# Patient Record
Sex: FEMALE | Race: WHITE | NOT HISPANIC OR LATINO | Employment: OTHER | URBAN - METROPOLITAN AREA
[De-identification: names, ages, dates, MRNs, and addresses within clinical notes are randomized per-mention and may not be internally consistent; named-entity substitution may affect disease eponyms.]

---

## 2020-06-18 ENCOUNTER — TELEPHONE (OUTPATIENT)
Dept: RHEUMATOLOGY | Facility: CLINIC | Age: 65
End: 2020-06-18

## 2020-06-18 NOTE — TELEPHONE ENCOUNTER
----- Message from Valerie Arthur sent at 6/18/2020  1:26 PM CDT -----  Regarding: Pt Advice  Contact: Pt  States that she would like to have a medical record release form mailed to her at the address on file for her to send to her previous provider to have medical records sent to Dr Cortés. Please call back at [phone]//thank you

## 2020-06-18 NOTE — TELEPHONE ENCOUNTER
Spoke with pt and she was calling to get a release form mailed to her home to get her records sent to us before her 7.20.20appt. 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 States she has a lot of demyelination and white brain matter changes and was told she had early MS. States that she was given nuvigil and norco but did not want to take the norco because she was afraid of becoming addicted. States she was put on tramadol and that takes the edge off. States her  because ill and had vascular dementia so they moved back to [place] and he passed 12.25. Pt states that she saw a PCP at [facility] and was taken off all her medications and has since been having a lot of problems. 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 States she was told she needed to see a rheumatology for fibromyalgia. Pt also states that her sister sees Dr. DUEÑAS but she does not want us to know who her sister is because  She does not want her sister to know any of her medical information. Advised patient we would not release her information without her consent. Advised patient we would mail her a authorization for release of medical records for her to sign and mail to her previous MD. Pt verbalized understanding

## 2020-07-17 ENCOUNTER — TELEPHONE (OUTPATIENT)
Dept: RHEUMATOLOGY | Facility: CLINIC | Age: 65
End: 2020-07-17

## 2020-07-17 NOTE — TELEPHONE ENCOUNTER
Spoke with pt and confirmed appointment with Dr. Cortés for 7.20.20 at 11.45 am. Advised patient of check in process due to COVID 19.       Pt declined virtual visit for this appointment.

## 2020-07-20 ENCOUNTER — TELEPHONE (OUTPATIENT)
Dept: RHEUMATOLOGY | Facility: CLINIC | Age: 65
End: 2020-07-20

## 2020-07-20 ENCOUNTER — LAB VISIT (OUTPATIENT)
Dept: LAB | Facility: HOSPITAL | Age: 65
End: 2020-07-20
Attending: INTERNAL MEDICINE
Payer: MEDICARE

## 2020-07-20 ENCOUNTER — OFFICE VISIT (OUTPATIENT)
Dept: RHEUMATOLOGY | Facility: CLINIC | Age: 65
End: 2020-07-20
Payer: MEDICARE

## 2020-07-20 VITALS
SYSTOLIC BLOOD PRESSURE: 127 MMHG | WEIGHT: 144.38 LBS | HEART RATE: 80 BPM | BODY MASS INDEX: 23.2 KG/M2 | HEIGHT: 66 IN | DIASTOLIC BLOOD PRESSURE: 77 MMHG

## 2020-07-20 DIAGNOSIS — F41.8 DEPRESSION WITH ANXIETY: ICD-10-CM

## 2020-07-20 DIAGNOSIS — M79.7 FIBROMYALGIA: ICD-10-CM

## 2020-07-20 DIAGNOSIS — M79.7 FIBROMYALGIA: Primary | ICD-10-CM

## 2020-07-20 DIAGNOSIS — R29.90 MULTIPLE NEUROLOGICAL SYMPTOMS: ICD-10-CM

## 2020-07-20 DIAGNOSIS — R25.1 TREMORS OF NERVOUS SYSTEM: ICD-10-CM

## 2020-07-20 DIAGNOSIS — R27.8 OTHER LACK OF COORDINATION: ICD-10-CM

## 2020-07-20 LAB
ALBUMIN SERPL BCP-MCNC: 4.2 G/DL (ref 3.5–5.2)
ALP SERPL-CCNC: 123 U/L (ref 55–135)
ALT SERPL W/O P-5'-P-CCNC: 28 U/L (ref 10–44)
ANION GAP SERPL CALC-SCNC: 8 MMOL/L (ref 8–16)
AST SERPL-CCNC: 32 U/L (ref 10–40)
BASOPHILS # BLD AUTO: 0.04 K/UL (ref 0–0.2)
BASOPHILS NFR BLD: 0.8 % (ref 0–1.9)
BILIRUB SERPL-MCNC: 0.2 MG/DL (ref 0.1–1)
BUN SERPL-MCNC: 11 MG/DL (ref 8–23)
CALCIUM SERPL-MCNC: 9.6 MG/DL (ref 8.7–10.5)
CHLORIDE SERPL-SCNC: 101 MMOL/L (ref 95–110)
CK SERPL-CCNC: 55 U/L (ref 20–180)
CO2 SERPL-SCNC: 30 MMOL/L (ref 23–29)
CREAT SERPL-MCNC: 0.8 MG/DL (ref 0.5–1.4)
CRP SERPL-MCNC: 4.8 MG/L (ref 0–8.2)
DIFFERENTIAL METHOD: ABNORMAL
EOSINOPHIL # BLD AUTO: 0.1 K/UL (ref 0–0.5)
EOSINOPHIL NFR BLD: 2.5 % (ref 0–8)
ERYTHROCYTE [DISTWIDTH] IN BLOOD BY AUTOMATED COUNT: 13.3 % (ref 11.5–14.5)
ERYTHROCYTE [SEDIMENTATION RATE] IN BLOOD BY WESTERGREN METHOD: 11 MM/HR (ref 0–36)
EST. GFR  (AFRICAN AMERICAN): >60 ML/MIN/1.73 M^2
EST. GFR  (NON AFRICAN AMERICAN): >60 ML/MIN/1.73 M^2
GLUCOSE SERPL-MCNC: 86 MG/DL (ref 70–110)
HCT VFR BLD AUTO: 43.9 % (ref 37–48.5)
HGB BLD-MCNC: 13.5 G/DL (ref 12–16)
IMM GRANULOCYTES # BLD AUTO: 0.02 K/UL (ref 0–0.04)
IMM GRANULOCYTES NFR BLD AUTO: 0.4 % (ref 0–0.5)
LYMPHOCYTES # BLD AUTO: 1.2 K/UL (ref 1–4.8)
LYMPHOCYTES NFR BLD: 22.8 % (ref 18–48)
MCH RBC QN AUTO: 28.6 PG (ref 27–31)
MCHC RBC AUTO-ENTMCNC: 30.8 G/DL (ref 32–36)
MCV RBC AUTO: 93 FL (ref 82–98)
MONOCYTES # BLD AUTO: 0.5 K/UL (ref 0.3–1)
MONOCYTES NFR BLD: 8.9 % (ref 4–15)
NEUTROPHILS # BLD AUTO: 3.4 K/UL (ref 1.8–7.7)
NEUTROPHILS NFR BLD: 64.6 % (ref 38–73)
NRBC BLD-RTO: 0 /100 WBC
PLATELET # BLD AUTO: 255 K/UL (ref 150–350)
PMV BLD AUTO: 10.9 FL (ref 9.2–12.9)
POTASSIUM SERPL-SCNC: 3.6 MMOL/L (ref 3.5–5.1)
PROT SERPL-MCNC: 7.9 G/DL (ref 6–8.4)
RBC # BLD AUTO: 4.72 M/UL (ref 4–5.4)
SODIUM SERPL-SCNC: 139 MMOL/L (ref 136–145)
TSH SERPL DL<=0.005 MIU/L-ACNC: 0.9 UIU/ML (ref 0.4–4)
WBC # BLD AUTO: 5.18 K/UL (ref 3.9–12.7)

## 2020-07-20 PROCEDURE — 85025 COMPLETE CBC W/AUTO DIFF WBC: CPT

## 2020-07-20 PROCEDURE — 86235 NUCLEAR ANTIGEN ANTIBODY: CPT | Mod: 59

## 2020-07-20 PROCEDURE — 99205 PR OFFICE/OUTPT VISIT, NEW, LEVL V, 60-74 MIN: ICD-10-PCS | Mod: S$GLB,,, | Performed by: INTERNAL MEDICINE

## 2020-07-20 PROCEDURE — 86431 RHEUMATOID FACTOR QUANT: CPT

## 2020-07-20 PROCEDURE — 86200 CCP ANTIBODY: CPT

## 2020-07-20 PROCEDURE — 82306 VITAMIN D 25 HYDROXY: CPT

## 2020-07-20 PROCEDURE — 86038 ANTINUCLEAR ANTIBODIES: CPT

## 2020-07-20 PROCEDURE — 99205 OFFICE O/P NEW HI 60 MIN: CPT | Mod: S$GLB,,, | Performed by: INTERNAL MEDICINE

## 2020-07-20 PROCEDURE — 86039 ANTINUCLEAR ANTIBODIES (ANA): CPT

## 2020-07-20 PROCEDURE — 3008F PR BODY MASS INDEX (BMI) DOCUMENTED: ICD-10-PCS | Mod: CPTII,S$GLB,, | Performed by: INTERNAL MEDICINE

## 2020-07-20 PROCEDURE — 85652 RBC SED RATE AUTOMATED: CPT

## 2020-07-20 PROCEDURE — 99999 PR PBB SHADOW E&M-EST. PATIENT-LVL V: ICD-10-PCS | Mod: PBBFAC,,, | Performed by: INTERNAL MEDICINE

## 2020-07-20 PROCEDURE — 82085 ASSAY OF ALDOLASE: CPT

## 2020-07-20 PROCEDURE — 3008F BODY MASS INDEX DOCD: CPT | Mod: CPTII,S$GLB,, | Performed by: INTERNAL MEDICINE

## 2020-07-20 PROCEDURE — 80053 COMPREHEN METABOLIC PANEL: CPT

## 2020-07-20 PROCEDURE — 82550 ASSAY OF CK (CPK): CPT

## 2020-07-20 PROCEDURE — 82607 VITAMIN B-12: CPT

## 2020-07-20 PROCEDURE — 99999 PR PBB SHADOW E&M-EST. PATIENT-LVL V: CPT | Mod: PBBFAC,,, | Performed by: INTERNAL MEDICINE

## 2020-07-20 PROCEDURE — 36415 COLL VENOUS BLD VENIPUNCTURE: CPT

## 2020-07-20 PROCEDURE — 86140 C-REACTIVE PROTEIN: CPT

## 2020-07-20 PROCEDURE — 84443 ASSAY THYROID STIM HORMONE: CPT

## 2020-07-20 RX ORDER — MODAFINIL 200 MG/1
200 TABLET ORAL 2 TIMES DAILY
COMMUNITY
End: 2020-10-01

## 2020-07-20 RX ORDER — ESCITALOPRAM OXALATE 20 MG/1
20 TABLET ORAL
COMMUNITY
End: 2020-08-12

## 2020-07-20 RX ORDER — ALPRAZOLAM 1 MG/1
1 TABLET, ORALLY DISINTEGRATING ORAL
COMMUNITY
End: 2020-09-09

## 2020-07-20 RX ORDER — TRAMADOL HYDROCHLORIDE 50 MG/1
100 TABLET ORAL
COMMUNITY
End: 2020-10-01

## 2020-07-20 RX ORDER — DULOXETIN HYDROCHLORIDE 60 MG/1
60 CAPSULE, DELAYED RELEASE ORAL
COMMUNITY
Start: 2020-06-12 | End: 2020-08-17

## 2020-07-20 NOTE — Clinical Note
Labs today, [facility], [facility]/internal medicine/neuropsychology.

## 2020-07-21 LAB
25(OH)D3+25(OH)D2 SERPL-MCNC: 81 NG/ML (ref 30–96)
ANA PATTERN 1: NORMAL
ANA SER QL IF: POSITIVE
ANA TITR SER IF: NORMAL {TITER}
CCP AB SER IA-ACNC: <0.5 U/ML
RHEUMATOID FACT SERPL-ACNC: <10 IU/ML (ref 0–15)
VIT B12 SERPL-MCNC: 547 PG/ML (ref 210–950)

## 2020-07-21 NOTE — PROGRESS NOTES
RHEUMATOLOGY CLINIC INITIAL VISIT    Reason for referral:-  Fibromyalgia    Chief complaints:-  Pain all over the body, severe fatigue, weakness    HPI:-  Nirali Nieto a 65 y.o. pleasant female comes in for an initial visit with above chief complaints.  She has longstanding history of multiple neurological problems.  She lived in [place] and moved here recently.  When she lived in [place] she underwent multiple workup and was under care of neurologist.  After multiple workup remained negative she was diagnosed with fibromyalgia with neurological features.  She has been on multiple medications with no significant improvement of her symptoms.  She also has severe depression and anxiety disorder.  She has been noticing worsening tremors in the past several months.  She denies any photosensitive malar rash, sicca syndrome or Raynaud's phenomenon.  No prolonged morning stiffness.  Stiffness lasts all day long all over the body.  No significant swelling of small joints of hands or feet.  No personal or family history of psoriasis.    Review of Systems   Constitutional: Positive for malaise/fatigue. Negative for chills and fever.   HENT: Negative for congestion and sore throat.    Eyes: Negative for blurred vision and redness.   Respiratory: Negative for cough and shortness of breath.    Cardiovascular: Negative for chest pain and leg swelling.   Gastrointestinal: Negative for abdominal pain.   Genitourinary: Negative for dysuria.   Musculoskeletal: Positive for back pain, joint pain, myalgias and neck pain. Negative for falls.   Skin: Negative for rash.   Neurological: Positive for dizziness, tingling, tremors, focal weakness, weakness and headaches.   Endo/Heme/Allergies: Positive for environmental allergies. Bruises/bleeds easily.   Psychiatric/Behavioral: Positive for depression and memory loss. The patient is nervous/anxious and has insomnia.        Past Medical History:   Diagnosis Date    Anxiety      "Chronic fatigue     Depression     Fibromyalgia        Past Surgical History:   Procedure Laterality Date    HYSTERECTOMY          Social History     Tobacco Use    Smoking status: Former Smoker     Types: Cigarettes     Quit date: 2013     Years since quittin.5    Smokeless tobacco: Never Used   Substance Use Topics    Alcohol use: Never     Frequency: Never    Drug use: Never       History reviewed. No pertinent family history.    Review of patient's allergies indicates:  No Known Allergies    Vitals:    20 1207   BP: 127/77   Pulse: 80   Weight: 65.5 kg (144 lb 6.4 oz)   Height: 5' 6" (1.676 m)   PainSc:   3   PainLoc: Generalized       Physical Exam   Constitutional: She is oriented to person, place, and time and well-developed, well-nourished, and in no distress. No distress.   HENT:   Head: Normocephalic.   Mouth/Throat: Oropharynx is clear and moist.   Eyes: Pupils are equal, round, and reactive to light. Conjunctivae and EOM are normal.   Neck: Normal range of motion. Neck supple.   Cardiovascular: Normal rate and intact distal pulses.   Pulmonary/Chest: Effort normal. No respiratory distress.   Abdominal: Soft. There is no abdominal tenderness.   Musculoskeletal:      Comments: No synovitis over small joints of hands or feet.  No effusion over large joints.   Neurological: She is alert and oriented to person, place, and time. No cranial nerve deficit. She exhibits abnormal muscle tone. Coordination abnormal.   Widespread tremors.   Skin: Skin is warm. No rash noted. No erythema.   Psychiatric: Mood and affect normal.   Nursing note and vitals reviewed.        Medication List with Changes/Refills   New Medications    NALTREXONE CAPSULE    Take 1 capsule (4.5 mg total) by mouth every evening.   Current Medications    ALPRAZOLAM (NIRAVAM) 1 MG DISSOLVABLE TABLET    Take 1 mg by mouth.    CHOLECALCIFEROL, VITAMIN D3, (VITAMIN D3 ORAL)    Take 5,000 Int'l Units by mouth.    DULOXETINE " (CYMBALTA) 60 MG CAPSULE    Take 60 mg by mouth.    EPINEPHRINE (EPIPEN INJ)    Inject as directed.    ESCITALOPRAM OXALATE (LEXAPRO) 20 MG TABLET    Take 20 mg by mouth.    MODAFINIL (PROVIGIL) 200 MG TAB    Take 200 mg by mouth.    NON FORMULARY MEDICATION    CBD Full spectrum high potency    ONDANSETRON 8 MG FILM    Take 8 mg by mouth.    SUMATRIPTAN SUCCINATE ORAL    Take by mouth.    TRAMADOL (ULTRAM) 50 MG TABLET    Take 100 mg by mouth as needed.       Assessment/Plans:-  1. Fibromyalgia    2. Depression with anxiety    3. Tremors of nervous system    4. Multiple neurological symptoms    5. Other lack of coordination       Plans:-  1.  Uncontrolled fibromyalgia with multiple tender points, widespread pain index and chronic fatigue.  Start low-dose naltrexone  2.  Severe depression with anxiety disorder for prolonged duration.  Referral sent to psychiatrist  3.  She has multiple neurological symptoms including lack of coordination and longstanding tremors.  Unclear etiology.  Not sure how this is related to fibromyalgia.  Referral sent a neurologist and neuropsychologist.        Problem List Items Addressed This Visit     Depression with anxiety    Relevant Orders    Ambulatory referral/consult to Psychiatry    Ambulatory referral/consult to Neuropsychology    CBC auto differential (Completed)    Comprehensive metabolic panel (Completed)    Cyclic Citrullinated Peptide Antibody, IgG (Completed)    VIPUL Screen w/Reflex    C-Reactive Protein (Completed)    Sedimentation rate (Completed)    Rheumatoid factor (Completed)    CK (Completed)    Aldolase    Vitamin D    TSH (Completed)    Ambulatory referral/consult to Internal Medicine    Tremors of nervous system    Relevant Orders    Ambulatory referral/consult to Psychiatry    Ambulatory referral/consult to Neuropsychology    CBC auto differential (Completed)    Comprehensive metabolic panel (Completed)    Cyclic Citrullinated Peptide Antibody, IgG (Completed)     VIPUL Screen w/Reflex    C-Reactive Protein (Completed)    Sedimentation rate (Completed)    Rheumatoid factor (Completed)    CK (Completed)    Aldolase    Vitamin D    TSH (Completed)    Vitamin B12    Ambulatory referral/consult to Internal Medicine    Ambulatory referral/consult to Neurology    Fibromyalgia - Primary    Relevant Medications    naltrexone capsule    Other Relevant Orders    CBC auto differential (Completed)    Comprehensive metabolic panel (Completed)    Cyclic Citrullinated Peptide Antibody, IgG (Completed)    VIPUL Screen w/Reflex    C-Reactive Protein (Completed)    Sedimentation rate (Completed)    Rheumatoid factor (Completed)    CK (Completed)    Aldolase    Vitamin D    TSH (Completed)    Ambulatory referral/consult to Internal Medicine      Other Visit Diagnoses     Multiple neurological symptoms        Relevant Orders    Vitamin B12    Ambulatory referral/consult to Neurology    Other lack of coordination         Relevant Orders    Vitamin B12    Ambulatory referral/consult to Neurology          Follow up in about 4 weeks (around 8/17/2020).    Thank you for allowing me to participate in the care Odalisyohannes Moody.    Disclaimer: This note was prepared using voice recognition system and is likely to have sound alike errors and is not proof read.  Please call me with any questions.

## 2020-07-22 LAB — ALDOLASE SERPL-CCNC: 5.2 U/L (ref 1.2–7.6)

## 2020-07-23 LAB
ANTI SM ANTIBODY: 0.11 RATIO (ref 0–0.99)
ANTI SM/RNP ANTIBODY: 0.11 RATIO (ref 0–0.99)
ANTI-SM INTERPRETATION: NEGATIVE
ANTI-SM/RNP INTERPRETATION: NEGATIVE
ANTI-SSA ANTIBODY: 0.08 RATIO (ref 0–0.99)
ANTI-SSA INTERPRETATION: NEGATIVE
ANTI-SSB ANTIBODY: 0.05 RATIO (ref 0–0.99)
ANTI-SSB INTERPRETATION: NEGATIVE
DSDNA AB SER-ACNC: NORMAL [IU]/ML

## 2020-08-12 ENCOUNTER — OFFICE VISIT (OUTPATIENT)
Dept: PSYCHIATRY | Facility: CLINIC | Age: 65
End: 2020-08-12
Payer: COMMERCIAL

## 2020-08-12 ENCOUNTER — PATIENT MESSAGE (OUTPATIENT)
Dept: RHEUMATOLOGY | Facility: CLINIC | Age: 65
End: 2020-08-12

## 2020-08-12 DIAGNOSIS — F33.2 MDD (MAJOR DEPRESSIVE DISORDER), RECURRENT SEVERE, WITHOUT PSYCHOSIS: Primary | ICD-10-CM

## 2020-08-12 DIAGNOSIS — M79.7 FIBROMYALGIA: ICD-10-CM

## 2020-08-12 DIAGNOSIS — Z63.9 FAMILY DYNAMICS PROBLEM: ICD-10-CM

## 2020-08-12 DIAGNOSIS — T74.11XS PHYSICAL ABUSE OF ADULT BY PARTNER, SEQUELA: ICD-10-CM

## 2020-08-12 DIAGNOSIS — R25.1 TREMORS OF NERVOUS SYSTEM: ICD-10-CM

## 2020-08-12 DIAGNOSIS — T74.22XS CHILD SEXUAL ABUSE, SEQUELA: ICD-10-CM

## 2020-08-12 DIAGNOSIS — F43.21 GRIEF: ICD-10-CM
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
## 2020-08-12 PROBLEM — T74.11XA PHYSICAL ABUSE OF ADULT BY PARTNER: Status: ACTIVE | Noted: 2020-08-12

## 2020-08-12 PROBLEM — F41.8 DEPRESSION WITH ANXIETY: Status: RESOLVED | Noted: 2020-07-20 | Resolved: 2020-08-12

## 2020-08-12 PROBLEM — T74.22XA CHILD ABUSE, SEXUAL: Status: ACTIVE | Noted: 2020-08-12

## 2020-08-12 PROCEDURE — 90792 PSYCH DIAG EVAL W/MED SRVCS: CPT | Mod: 95,,, | Performed by: PSYCHIATRY & NEUROLOGY

## 2020-08-12 PROCEDURE — 90792 PR PSYCHIATRIC DIAGNOSTIC EVALUATION W/MEDICAL SERVICES: ICD-10-PCS | Mod: 95,,, | Performed by: PSYCHIATRY & NEUROLOGY

## 2020-08-12 RX ORDER — ESCITALOPRAM OXALATE 20 MG/1
20 TABLET ORAL DAILY
Qty: 30 TABLET | Refills: 11 | Status: SHIPPED | OUTPATIENT
Start: 2020-08-12 | End: 2020-08-17

## 2020-08-12 RX ORDER — LAMOTRIGINE 100 MG/1
TABLET ORAL
Qty: 30 TABLET | Refills: 2 | Status: SHIPPED | OUTPATIENT
Start: 2020-08-12 | End: 2020-08-17

## 2020-08-12 RX ORDER — LAMOTRIGINE 25 MG/1
TABLET ORAL
Qty: 49 TABLET | Refills: 0 | Status: SHIPPED | OUTPATIENT
Start: 2020-08-12 | End: 2020-08-17

## 2020-08-12 RX ORDER — DULOXETIN HYDROCHLORIDE 60 MG/1
60 CAPSULE, DELAYED RELEASE ORAL DAILY
Qty: 30 CAPSULE | Refills: 11 | Status: SHIPPED | OUTPATIENT
Start: 2020-08-12 | End: 2020-08-17

## 2020-08-12 SDOH — SOCIAL DETERMINANTS OF HEALTH (SDOH): PROBLEM RELATED TO PRIMARY SUPPORT GROUP, UNSPECIFIED: Z63.9

## 2020-08-12 NOTE — PATIENT INSTRUCTIONS
PLAN:     As no records accompany, such complicaes; yet  Epic reflects that she is on cymbalta and lexapro. (I renewed such)    Re: Benzo: Xanax     I see via [facility] that she HAD been on Xanax with varying amounts (usually the 1mg tab with amoutns from #30 to #120; most recently in May 2019 as #60 ; says she has parced out her use and has few left    She is clear that the Xanax was being Rx'd for FIBROMYALIA BY NEURO and not per se as primary anxiety / per se psyc.    As such I deferred writing Xanax myself at this time. She indicates has some left and she ahs appt with RADHA Orozco MD 8-19-20    See [facility] for further detail     She does not appear as she is misusing and was interested in my writing for Lamictal that may assist her with anxiety depression.     Nonetheless I explained risks of wihdrawal; she was aware.  I cautioned IF such occur , call 911 ior go to nearest ER. Understanding Expressed    She indicated that Rheumatology Dr Cortés is following and has already seen and following.    I did write for LAMICTAL as mood stabilizer starting at 25 mg and advancign to 100 mg over 1 month.    Please call  [facility] at [phone] and  arrange your FOLLOW UP TELEHEALTH (video)  appointment  with MISAEL Randall MD for Sept 9 or 10th and [facility] / next available Telehealth.     I did inform her that Intensive Outpatient Program (for processing grief and working to enhance mood and anxiety stabilization .     Yet she indicated that she was NOT interested in IOP at this time.     I made clear to her that such was not INPATIENT and that such is outpatient. Yet she again declined; she is aware can re request. Denies SI / HI psychosis.    She does have a nice compassionate side of her ; yet being overly kind to extent that she may not take care of her own needs appears an area for her to explore / work on (ie, boundaries).    She does live in [place] and such may be  challenge to frequent commuting for counseling. I did tell her of Telehealth and we will explore setting up for her vi telehealth    I did inform her that I and some counselors will be moving over to [facility] within month or so and she indicated she can drive to that ok.    I explained that even IF do telhealth it woul;d be helpful IF she comfortable actually comig into clinic. She expressed agreement.    Records: I have asked Med assit to attempt recorsd from CM Varghese / [facility] prac / [phone]    References: (reviewed with pt as well):    Anxiety &  phobia workbook by NAS Jewell PhD  (web retailers: used: $ 7-10)    Relaxation stress reduction workbook: JESSEE Araujo PhD ( used: $7-10)    Feeling Good Website: Rajat Armenta MD / [email or web address] [place] website (free)     [place]: Path to Better Sleep : https://www.veterantraining.[place].gov/insomnia/ (free)     Pt expressed appreciation for the visit today and did not have further question at this time though pt  was still informed to:     Call  if problems.    Call / Report Side Effects to Psyc MD     Encouraged to follow up with primary care / Gen Med MD for continued monitoring of general health and wellness.    Understanding was expressed; and no further concerns nor questions were raised at this time.     remember healthy self care:   eat right  attempt adequate rest   HANDWASHING / encourage such jesus. During this corona virus time   walk or light exercise within reason and as your general med team approves  read or explore any of reference materials / homework mentioned  reach out (I.e.,  connect with)  others who nuture and bring out best in you  avoid risky behaviors  keep your appointments  IF you  cannot make your appt THEN please call or go online to reschedule.  avoid  alcohol and illicit substances.  Look for the positive.  All is often relative-seek balance  Call sooner if needed : [phone]   Call 911 or go  to Emergency Room  (ER)  if any acute concerns
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
## 2020-08-13 NOTE — TELEPHONE ENCOUNTER
Yesterday had a lot of abdominal pain and got up to go to the bathroom and went normally and there was just a little blood on the toilet paper. States she then went back to sit down and started having the bad abdominal pains again and has to use her cane to get to the bathroom and had blood dripping now her legs and when she sat down she had bright red clots pass. Advised patient we would let Dr. DUEÑAS know but she needs to go to the ED for an evaluation. Pt states that she is not able to go to the ED right now because her father passed away yesterday and her sister is there and she is alone. Would like to know if there is something that could be called in to slow it down and help with the pain. Also states Dr. Richardson sent in an RX for lamictal yesterday that she has not picked up yet because she read about it and was concerned with the side effects. Would like Dr. DUEÑAS to review that as well and let know what to do. Please advise.

## 2020-08-13 NOTE — TELEPHONE ENCOUNTER
I called and discussed with patient. She has severe abdominal cramps and severe bright red blood per rectum associated with fatigue since yesterday. She has been taking low dose naltrexone since last month without any issues.  I advised her to get emergency medical help or go to ED immediately.

## 2020-08-15 ENCOUNTER — PATIENT MESSAGE (OUTPATIENT)
Dept: PSYCHIATRY | Facility: CLINIC | Age: 65
End: 2020-08-15

## 2020-08-17 ENCOUNTER — PATIENT MESSAGE (OUTPATIENT)
Dept: RHEUMATOLOGY | Facility: CLINIC | Age: 65
End: 2020-08-17

## 2020-08-17 ENCOUNTER — TELEPHONE (OUTPATIENT)
Dept: NEUROLOGY | Facility: CLINIC | Age: 65
End: 2020-08-17

## 2020-08-17 DIAGNOSIS — F33.2 MDD (MAJOR DEPRESSIVE DISORDER), RECURRENT SEVERE, WITHOUT PSYCHOSIS: ICD-10-CM

## 2020-08-17 RX ORDER — ESCITALOPRAM OXALATE 20 MG/1
20 TABLET ORAL EVERY MORNING
Qty: 30 TABLET | Refills: 2 | Status: SHIPPED | OUTPATIENT
Start: 2020-08-17 | End: 2020-09-09 | Stop reason: SDUPTHER

## 2020-08-17 RX ORDER — LAMOTRIGINE 100 MG/1
TABLET ORAL
Qty: 30 TABLET | Refills: 2 | Status: SHIPPED | OUTPATIENT
Start: 2020-08-17 | End: 2020-09-09

## 2020-08-17 RX ORDER — LAMOTRIGINE 25 MG/1
TABLET ORAL
Qty: 49 TABLET | Refills: 0 | Status: SHIPPED | OUTPATIENT
Start: 2020-08-17 | End: 2020-09-09

## 2020-08-17 RX ORDER — DULOXETIN HYDROCHLORIDE 60 MG/1
60 CAPSULE, DELAYED RELEASE ORAL DAILY
Qty: 30 CAPSULE | Refills: 2 | Status: SHIPPED | OUTPATIENT
Start: 2020-08-17 | End: 2020-09-09 | Stop reason: SDUPTHER

## 2020-08-17 NOTE — TELEPHONE ENCOUNTER
----- Message from Carley Godfrey sent at 8/17/2020  4:22 PM CDT -----  Contact: self/[phone]  Type:  Sooner Apoointment Request    Caller is requesting a sooner appointment.  Caller declined first available appointment listed below.  Caller will not accept being placed on the waitlist and is requesting a message be sent to doctor.  Name of Caller:patient  When is the first available appointment?10-18-20  Symptoms:Symptoms:   Would the patient rather a call back or a response via [employer]? Call back  Best Call Back Number:[phone]  Additional Information: Patient is referred by Dr. Milana Pichardo/[facility]

## 2020-08-18 ENCOUNTER — TELEPHONE (OUTPATIENT)
Dept: PSYCHIATRY | Facility: CLINIC | Age: 65
End: 2020-08-18

## 2020-08-18 NOTE — PROGRESS NOTES
Pt messaged after visit that  she now desires meds to go to [facility]    As such:    I cancelled ALL psyc med to prior local pharmacy and reissued ALL psyc med   to [facility]     lexapro 20   lamictal 25  lamictal 100   cymbalta 60     See orders for specific detail     I also called number in her demographics and left voicemail as she requested    D Post MD    Time 15 min

## 2020-08-19 ENCOUNTER — OFFICE VISIT (OUTPATIENT)
Dept: INTERNAL MEDICINE | Facility: CLINIC | Age: 65
End: 2020-08-19
Payer: MEDICARE

## 2020-08-19 VITALS
SYSTOLIC BLOOD PRESSURE: 134 MMHG | OXYGEN SATURATION: 97 % | DIASTOLIC BLOOD PRESSURE: 80 MMHG | BODY MASS INDEX: 22.71 KG/M2 | TEMPERATURE: 99 F | HEIGHT: 66 IN | HEART RATE: 96 BPM | WEIGHT: 141.31 LBS

## 2020-08-19 DIAGNOSIS — H53.9 VISION CHANGES: ICD-10-CM

## 2020-08-19 DIAGNOSIS — M79.7 FIBROMYALGIA: ICD-10-CM

## 2020-08-19 DIAGNOSIS — S09.90XA INJURY OF HEAD, INITIAL ENCOUNTER: ICD-10-CM

## 2020-08-19 DIAGNOSIS — E78.5 HYPERLIPIDEMIA, UNSPECIFIED HYPERLIPIDEMIA TYPE: ICD-10-CM

## 2020-08-19 DIAGNOSIS — F33.2 MDD (MAJOR DEPRESSIVE DISORDER), RECURRENT SEVERE, WITHOUT PSYCHOSIS: ICD-10-CM

## 2020-08-19 DIAGNOSIS — K62.5 RECTAL BLEEDING: Primary | ICD-10-CM

## 2020-08-19 DIAGNOSIS — N94.9 VAGINAL SYMPTOM: ICD-10-CM

## 2020-08-19 PROBLEM — G93.32 CHRONIC FATIGUE SYNDROME: Status: ACTIVE | Noted: 2019-10-14

## 2020-08-19 PROCEDURE — 3008F PR BODY MASS INDEX (BMI) DOCUMENTED: ICD-10-PCS | Mod: CPTII,S$GLB,, | Performed by: FAMILY MEDICINE

## 2020-08-19 PROCEDURE — 3008F BODY MASS INDEX DOCD: CPT | Mod: CPTII,S$GLB,, | Performed by: FAMILY MEDICINE

## 2020-08-19 PROCEDURE — 99204 OFFICE O/P NEW MOD 45 MIN: CPT | Mod: S$GLB,,, | Performed by: FAMILY MEDICINE

## 2020-08-19 PROCEDURE — 99999 PR PBB SHADOW E&M-EST. PATIENT-LVL V: CPT | Mod: PBBFAC,,, | Performed by: FAMILY MEDICINE

## 2020-08-19 PROCEDURE — 99204 PR OFFICE/OUTPT VISIT, NEW, LEVL IV, 45-59 MIN: ICD-10-PCS | Mod: S$GLB,,, | Performed by: FAMILY MEDICINE

## 2020-08-19 PROCEDURE — 99999 PR PBB SHADOW E&M-EST. PATIENT-LVL V: ICD-10-PCS | Mod: PBBFAC,,, | Performed by: FAMILY MEDICINE

## 2020-08-19 NOTE — LETTER
August 19, 2020      Kevin Cortés MD  [address]  [place] [address]           [facility]  [address] [place] [address]  Phone: [phone]  Fax: [phone]          Patient: Nirali Moody   MR Number: [id number]   YOB: 1955   Date of Visit: 8/19/2020       Dear Dr. Kevin Cortés:    Thank you for referring Nirali Moody to me for evaluation. Attached you will find relevant portions of my assessment and plan of care.    If you have questions, please do not hesitate to call me. I look forward to following Nirali Moody along with you.    Sincerely,    Ivy Orozco MD    Enclosure  CC:  No Recipients    If you would like to receive this communication electronically, please contact [email or web address] or [phone] to request more information on [employer] access.    For providers and/or their staff who would like to refer a patient to [facility], please contact us through our one-stop-shop provider referral line, [facility], at [phone].    If you feel you have received this communication in error or would no longer like to receive these types of communications, please e-mail [email or web address]

## 2020-08-19 NOTE — PROGRESS NOTES
"Subjective:       Patient ID: Nirali Moody is a 65 y.o. female.    Chief Complaint: Establish Care    Patient presents to clinic today to establish care. Reports her  passed away in 2019. Reports her father  in August. She has seen Dr. Cortés, Rheumatology, for treatment of fibromyalgia. She has seen Dr. Richardson, Psychiatry, for depression/anxiety. She requests that I refill her xanax, provigil and tramadol. She reports taking provigil for "brain fog". She indicates that she has had evaluation in the past by Neurology and they prescribed the provigil. She denies diagnosis of narcolepsy. She reports most of the items she marked on ROS are chronic and stable, except for a few. She reports abdominal cramping with rectal bleeding last weekend. Reports bloody mucous came out also. Reports stools were formed. This lasted for about 24 hours. Denies history of hemorrhoids. She reports this is due to stress from her father's death. She also reports her vagina had seemed closed for several months or longer, but after episode last weekend she reports it seems better. Patient also reports fall 2 months ago and shows picture on her phone showing significant brusing of left cheek. She reports she did not seek medical attention at that time. She reports vision changes since. She has not seen an eye doctor either. She reports history of elevated cholesterol, but does not desire to take a statin. Patient is otherwise without concerns today.      Review of Systems   Constitutional: Positive for activity change. Negative for unexpected weight change.   HENT: Positive for trouble swallowing. Negative for hearing loss and rhinorrhea.    Eyes: Positive for visual disturbance. Negative for discharge.   Respiratory: Positive for chest tightness. Negative for wheezing.    Cardiovascular: Positive for chest pain and palpitations.   Gastrointestinal: Positive for blood in stool. Negative for constipation, diarrhea and " vomiting.   Endocrine: Negative for polydipsia and polyuria.   Genitourinary: Negative for difficulty urinating, dysuria, hematuria and menstrual problem.   Musculoskeletal: Positive for arthralgias, joint swelling and neck pain.   Neurological: Positive for weakness and headaches.   Psychiatric/Behavioral: Positive for confusion and dysphoric mood.         Objective:      Physical Exam  Vitals signs reviewed.   Constitutional:       General: She is not in acute distress.     Appearance: Normal appearance. She is well-developed.   HENT:      Head: Normocephalic and atraumatic.      Right Ear: Tympanic membrane, ear canal and external ear normal.      Left Ear: Tympanic membrane, ear canal and external ear normal.      Nose: Nose normal. No mucosal edema or rhinorrhea.      Mouth/Throat:      Pharynx: Uvula midline.   Eyes:      General: Lids are normal. No scleral icterus.     Extraocular Movements: Extraocular movements intact.      Conjunctiva/sclera: Conjunctivae normal.      Pupils: Pupils are equal, round, and reactive to light.   Neck:      Musculoskeletal: Normal range of motion and neck supple.      Thyroid: No thyromegaly.   Cardiovascular:      Rate and Rhythm: Normal rate and regular rhythm.      Heart sounds: No murmur. No friction rub. No gallop.    Pulmonary:      Effort: Pulmonary effort is normal.      Breath sounds: Normal breath sounds. No wheezing, rhonchi or rales.   Abdominal:      General: Bowel sounds are normal. There is no distension.      Palpations: Abdomen is soft. There is no mass.      Tenderness: There is no abdominal tenderness.   Musculoskeletal: Normal range of motion.   Lymphadenopathy:      Cervical: No cervical adenopathy.   Skin:     General: Skin is warm and dry.      Findings: No lesion or rash.      Nails: There is no clubbing.     Neurological:      Mental Status: She is alert and oriented to person, place, and time.      Cranial Nerves: No cranial nerve deficit.       Sensory: No sensory deficit.      Gait: Gait normal.   Psychiatric:         Mood and Affect: Affect normal. Mood is anxious.         Assessment:       1. Rectal bleeding    2. Fibromyalgia    3. Vaginal symptom    4. Vision changes    5. Injury of head, initial encounter    6. Hyperlipidemia, unspecified hyperlipidemia type    7. MDD (major depressive disorder), recurrent severe, without psychosis        Plan:     Problem List Items Addressed This Visit     Fibromyalgia    Overview     Followed by Rheumatology         Head injury    Relevant Orders    CT Head W Wo Contrast    Hyperlipidemia    Relevant Orders    Lipid Panel    MDD (major depressive disorder), recurrent severe, without psychosis    Overview     Followed by Dr. Richardson, Psychiatry         Rectal bleeding - Primary    Relevant Orders    Ambulatory referral/consult to Gastroenterology    Vaginal symptom    Relevant Orders    Ambulatory referral/consult to Obstetrics / Gynecology    Vision changes    Relevant Orders    Ambulatory referral/consult to Optometry    CT Head W Wo Contrast        Patient asked several times during the visit for me to refill her xanax, provigil and tramadol. She was advised several times during the visit that I will not refill these medications. She was also informed prior to visit of this, but stated she was coming in to establish care. Advised that Dr. Cortés is treating her fibromyalgia and gave her naltrexone, she should not take tramadol with this medication. I also offered referral to pain management, she declines. I advised that Dr. Richardson is treating her psychiatric issues, so xanax is up to his discretion. Advised she can discuss the provigil with Neurology, prior referral made and appointment scheduled. Will have staff make sure she is on wait list for earlier appointment to see Dr. Ortiz.   Health Maintenance reviewed/updated. Patient reports most of HM has been done, release signed for previous records. Advised to  obtain flu vaccine this Fall.

## 2020-08-20 ENCOUNTER — TELEPHONE (OUTPATIENT)
Dept: SURGERY | Facility: CLINIC | Age: 65
End: 2020-08-20

## 2020-08-20 NOTE — TELEPHONE ENCOUNTER
Returned pt's call - Left voicemail message with my name and call back number [phone] - Will continue to try and reach pt throughout the day    ----- Message from Livier Nagy sent at 8/20/2020  1:32 PM CDT -----  ..Type:  Patient Returning Call    Who Called: pt   Who Left Message for Patient:  Does the patient know what this is regarding?:  appt   Would the patient rather a call back or a response via [facility]? Call back   Best Call Back Number:[phone]  Additional Information: pt is requesting a call from nurse to discuss an apt and other information

## 2020-08-24 ENCOUNTER — TELEPHONE (OUTPATIENT)
Dept: RHEUMATOLOGY | Facility: CLINIC | Age: 65
End: 2020-08-24

## 2020-08-27 ENCOUNTER — PATIENT OUTREACH (OUTPATIENT)
Dept: ADMINISTRATIVE | Facility: HOSPITAL | Age: 65
End: 2020-08-27

## 2020-08-27 ENCOUNTER — OFFICE VISIT (OUTPATIENT)
Dept: RHEUMATOLOGY | Facility: CLINIC | Age: 65
End: 2020-08-27
Payer: MEDICARE

## 2020-08-27 ENCOUNTER — TELEPHONE (OUTPATIENT)
Dept: RHEUMATOLOGY | Facility: CLINIC | Age: 65
End: 2020-08-27

## 2020-08-27 DIAGNOSIS — R29.90 MULTIPLE NEUROLOGICAL SYMPTOMS: ICD-10-CM

## 2020-08-27 DIAGNOSIS — M79.7 FIBROMYALGIA: Primary | ICD-10-CM

## 2020-08-27 DIAGNOSIS — R76.8 POSITIVE ANA (ANTINUCLEAR ANTIBODY): ICD-10-CM

## 2020-08-27 DIAGNOSIS — M62.838 MUSCLE SPASM: ICD-10-CM

## 2020-08-27 PROCEDURE — 99214 OFFICE O/P EST MOD 30 MIN: CPT | Mod: 95,,, | Performed by: INTERNAL MEDICINE

## 2020-08-27 PROCEDURE — 99214 PR OFFICE/OUTPT VISIT, EST, LEVL IV, 30-39 MIN: ICD-10-PCS | Mod: 95,,, | Performed by: INTERNAL MEDICINE

## 2020-08-27 NOTE — PROGRESS NOTES
RHEUMATOLOGY FOLLOW UP - TELE VISIT     The patient location is: [place]  The chief complaint leading to consultation is: Worsening cramps and spasms.  Visit type: Virtual visit with synchronous audio and video  Total time spent with patient: 15 minutes  Each patient to whom he or she provides medical services by telemedicine is:  (1) informed of the relationship between the physician and patient and the respective role of any other health care provider with respect to management of the patient; and (2) notified that he or she may decline to receive medical services by telemedicine and may withdraw from such care at any time.    HPI:-  Nirali Nieto a 65 y.o. pleasant female seen today through My chart video visit for follow up.  She reports significant improvement of fibromyalgia since starting naltrexone.  But her muscle spasms and cramps have been worsening.  She is having more tremors, numbness tingling all over the body.  Earliest that the neurologist could see her is in December.  Psychiatrist denied continuing her benzodiazepine at this time.    Review of Systems   Constitutional: Positive for malaise/fatigue. Negative for chills and fever.   HENT: Negative for congestion and sore throat.    Eyes: Negative for blurred vision and redness.   Respiratory: Negative for cough and shortness of breath.    Cardiovascular: Negative for chest pain and leg swelling.   Gastrointestinal: Negative for abdominal pain.   Genitourinary: Negative for dysuria.   Musculoskeletal: Positive for back pain, joint pain, myalgias and neck pain. Negative for falls.   Skin: Negative for rash.   Neurological: Positive for dizziness, tingling, tremors, focal weakness, weakness and headaches.   Endo/Heme/Allergies: Positive for environmental allergies. Bruises/bleeds easily.   Psychiatric/Behavioral: Positive for depression and memory loss. The patient is nervous/anxious and has insomnia.        Past Medical History:   Diagnosis Date     Anxiety     Chronic fatigue     Depression     Fibromyalgia        Past Surgical History:   Procedure Laterality Date    HYSTERECTOMY      TONSILLECTOMY          Social History     Tobacco Use    Smoking status: Former Smoker     Types: Cigarettes     Quit date: 2013     Years since quittin.6    Smokeless tobacco: Never Used   Substance Use Topics    Alcohol use: Never     Frequency: Never     Binge frequency: Never    Drug use: Never       Family History   Problem Relation Age of Onset    Arthritis Mother     Cancer Mother     Depression Mother     Stroke Mother     Diabetes Father     Arthritis Sister     Cancer Sister     Depression Sister     Kidney disease Sister     Depression Brother        Review of patient's allergies indicates:  No Known Allergies    Physical exam:-    GEN: awake, alert, non-diaphoretic, no psychomotor agitation, no acute distress    HEENT :Head: atraumatic, normocephalic, no rashes noted, no lesions noted;    Eyes: NO redness, discharge, swelling, or lesions    Nose: NO redness, swelling, discharge, deformity, or impetigo/crusting    Skin: no lesions, wounds, erythema, or cyanosis noted on face or hands    Cardiopulmonary: no increased respiratory effort, speaking in clear sentences    MSK: normal ROM in the neck, Upper extremities, Lower extremities  Good ROM of hands, fist formation 100% and , no obvious synovitis    Good ambulation in front of the camera    Neuro: cranial nerves grossly normal, speech normal rate and rhythm, orientation arrived to appointment on time with no prompting, moved both upper extremities equally    Pysch:  appearance, behavior, and attitude- well groomed, pleasant, cooperative    Medication List with Changes/Refills   Current Medications    ALPRAZOLAM (NIRAVAM) 1 MG DISSOLVABLE TABLET    Take 1 mg by mouth.    CHOLECALCIFEROL, VITAMIN D3, (VITAMIN D3 ORAL)    Take 5,000 Int'l Units by mouth.    DULOXETINE (CYMBALTA) 60 MG CAPSULE     Take 1 capsule (60 mg total) by mouth once daily.    EPINEPHRINE (EPIPEN INJ)    Inject as directed.    ESCITALOPRAM OXALATE (LEXAPRO) 20 MG TABLET    Take 1 tablet (20 mg total) by mouth every morning.    LAMOTRIGINE (LAMICTAL) 100 MG TABLET    1 tab by mouth at bed. Note: DO NOT START UNTIL you have Finished the Lamictal 25 mg supply. IF any RASH, STOP ALL Lamictal and Tell Tonia WELLS.    LAMOTRIGINE (LAMICTAL) 25 MG TABLET    By mouth and at night: 1 tab x 14 days THEN 2 tabs x 7 days THEN 3 tabs x 7 days THEN move to Lamictal 100 mg supply. Note: STOP if any RASH.    MODAFINIL (PROVIGIL) 200 MG TAB    Take 200 mg by mouth 2 (two) times daily.     NALTREXONE CAPSULE    Take 1 capsule (4.5 mg total) by mouth every evening.    NON FORMULARY MEDICATION    CBD Full spectrum high potency    ONDANSETRON 8 MG FILM    Take 8 mg by mouth as needed.     SUMATRIPTAN SUCCINATE ORAL    Take by mouth.    TRAMADOL (ULTRAM) 50 MG TABLET    Take 100 mg by mouth as needed.       Assessment/Plans:-  1. Fibromyalgia    2. Multiple neurological symptoms    3. Positive VIPUL (antinuclear antibody)    4. Muscle spasm      · Significant improvement of fibromyalgia on low-dose naltrexone.  Continue the same.  · Worsening muscle spasms all over the body with multiple other neurological symptoms.  Next appointment with Neurology is in December.  Will try to find another external neurologist who can see her sooner and restart her benzodiazepine.  · Positive VIPUL with negative LORNE without any evidence of underlying autoimmune inflammatory connective tissue disease.  Monitor for any new symptoms.    Problem List Items Addressed This Visit     Fibromyalgia - Primary    Overview     Followed by Rheumatology         Multiple neurological symptoms    Relevant Orders    Ambulatory referral/consult to Neurology    Positive VIPUL (antinuclear antibody)    Muscle spasm    Relevant Orders    Ambulatory referral/consult to Neurology          No follow-ups on  file.    Disclaimer: This note was prepared using voice recognition system and is likely to have sound alike errors and is not proof read.  Please call me with any questions.

## 2020-08-27 NOTE — TELEPHONE ENCOUNTER
Spoke with pt and scheduled virtual visit for 12.1.20 at 11.45 am. Will send referral to neuromedical center.

## 2020-08-27 NOTE — TELEPHONE ENCOUNTER
----- Message from Kevin Cortés MD sent at 8/27/2020  1:54 PM CDT -----  Please find her an external neurologist since the internal does not have openings at this time.

## 2020-08-31 ENCOUNTER — PATIENT OUTREACH (OUTPATIENT)
Dept: ADMINISTRATIVE | Facility: HOSPITAL | Age: 65
End: 2020-08-31

## 2020-08-31 ENCOUNTER — TELEPHONE (OUTPATIENT)
Dept: PSYCHIATRY | Facility: CLINIC | Age: 65
End: 2020-08-31

## 2020-09-01 NOTE — TELEPHONE ENCOUNTER
Called Ms Moody:    I had sent message 8-25- asking her to call and make follow up appt.     Yet I do not see that she had done so.    As such I called myself and left message that I would work her in this ed or thur 4;30p OR she can call [phone] and select / schedule time that works for her     IF any acute concerns call 911 or go to nearest ER     D Post MD

## 2020-09-03 ENCOUNTER — PATIENT MESSAGE (OUTPATIENT)
Dept: RHEUMATOLOGY | Facility: CLINIC | Age: 65
End: 2020-09-03

## 2020-09-08 ENCOUNTER — TELEPHONE (OUTPATIENT)
Dept: RHEUMATOLOGY | Facility: CLINIC | Age: 65
End: 2020-09-08

## 2020-09-08 NOTE — TELEPHONE ENCOUNTER
[facility]     Labs from previous provider in [facility] scanned in

## 2020-09-09 ENCOUNTER — HOSPITAL ENCOUNTER (OUTPATIENT)
Dept: RADIOLOGY | Facility: HOSPITAL | Age: 65
Discharge: HOME | End: 2020-09-09
Attending: FAMILY MEDICINE
Payer: MEDICARE

## 2020-09-09 ENCOUNTER — OFFICE VISIT (OUTPATIENT)
Dept: PSYCHIATRY | Facility: CLINIC | Age: 65
End: 2020-09-09
Payer: MEDICARE

## 2020-09-09 VITALS — SYSTOLIC BLOOD PRESSURE: 183 MMHG | HEART RATE: 73 BPM | DIASTOLIC BLOOD PRESSURE: 105 MMHG

## 2020-09-09 DIAGNOSIS — T74.22XS CHILD SEXUAL ABUSE, SEQUELA: ICD-10-CM

## 2020-09-09 DIAGNOSIS — M79.7 FIBROMYALGIA: ICD-10-CM
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
## 2020-09-09 DIAGNOSIS — Z63.9 FAMILY DYNAMICS PROBLEM: ICD-10-CM

## 2020-09-09 DIAGNOSIS — F33.2 MDD (MAJOR DEPRESSIVE DISORDER), RECURRENT SEVERE, WITHOUT PSYCHOSIS: ICD-10-CM

## 2020-09-09 DIAGNOSIS — F43.21 GRIEF: Primary | ICD-10-CM

## 2020-09-09 DIAGNOSIS — H53.9 VISION CHANGES: ICD-10-CM

## 2020-09-09 DIAGNOSIS — R25.1 TREMORS OF NERVOUS SYSTEM: ICD-10-CM

## 2020-09-09 DIAGNOSIS — S09.90XA INJURY OF HEAD, INITIAL ENCOUNTER: ICD-10-CM

## 2020-09-09 DIAGNOSIS — T74.11XS PHYSICAL ABUSE OF ADULT BY PARTNER, SEQUELA: ICD-10-CM

## 2020-09-09 PROCEDURE — 99999 PR PBB SHADOW E&M-EST. PATIENT-LVL I: ICD-10-PCS | Mod: PBBFAC,,, | Performed by: PSYCHIATRY & NEUROLOGY

## 2020-09-09 PROCEDURE — 99999 PR PBB SHADOW E&M-EST. PATIENT-LVL I: CPT | Mod: PBBFAC,,, | Performed by: PSYCHIATRY & NEUROLOGY

## 2020-09-09 PROCEDURE — 25500020 PHARM REV CODE 255: Performed by: FAMILY MEDICINE

## 2020-09-09 PROCEDURE — 99214 PR OFFICE/OUTPT VISIT, EST, LEVL IV, 30-39 MIN: ICD-10-PCS | Mod: 95,,, | Performed by: PSYCHIATRY & NEUROLOGY

## 2020-09-09 PROCEDURE — 70470 CT HEAD/BRAIN W/O & W/DYE: CPT | Mod: TC

## 2020-09-09 PROCEDURE — 99214 OFFICE O/P EST MOD 30 MIN: CPT | Mod: 95,,, | Performed by: PSYCHIATRY & NEUROLOGY

## 2020-09-09 RX ORDER — ESCITALOPRAM OXALATE 20 MG/1
20 TABLET ORAL EVERY MORNING
Qty: 30 TABLET | Refills: 2 | Status: SHIPPED | OUTPATIENT
Start: 2020-09-09 | End: 2020-10-22 | Stop reason: SDUPTHER

## 2020-09-09 RX ORDER — DULOXETIN HYDROCHLORIDE 60 MG/1
60 CAPSULE, DELAYED RELEASE ORAL EVERY MORNING
Qty: 30 CAPSULE | Refills: 2 | Status: SHIPPED | OUTPATIENT
Start: 2020-09-09 | End: 2020-10-22 | Stop reason: SDUPTHER

## 2020-09-09 RX ORDER — DULOXETIN HYDROCHLORIDE 30 MG/1
30 CAPSULE, DELAYED RELEASE ORAL EVERY MORNING
Qty: 30 CAPSULE | Refills: 2 | Status: SHIPPED | OUTPATIENT
Start: 2020-09-09 | End: 2020-10-22 | Stop reason: SDUPTHER

## 2020-09-09 RX ADMIN — IOHEXOL 100 ML: 350 INJECTION, SOLUTION INTRAVENOUS at 12:09

## 2020-09-09 SDOH — SOCIAL DETERMINANTS OF HEALTH (SDOH): PROBLEM RELATED TO PRIMARY SUPPORT GROUP, UNSPECIFIED: Z63.9
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
## 2020-09-10 ENCOUNTER — TELEPHONE (OUTPATIENT)
Dept: RHEUMATOLOGY | Facility: CLINIC | Age: 65
End: 2020-09-10

## 2020-09-10 NOTE — PATIENT INSTRUCTIONS
PLAN:     Follow up with [facility] D Post MD 4 weeks    Jake Hurd LCSW next available     Lamictal was stopped as headaches ; added as allergy ; as intolerant    Cymbalta advanced from 60 mg to 90 mg; offered hydroxyzine  / declined    Understanding Expressed    ENCOURAGED HER TO FOLLOW UP WITH DR PRATT WITHIN WEEK OR TWO FOR BP    She indicated that Rheumatology Dr Cortés is following and has already seen and following.   Jake Hurd LCSW / next available Telehealth.     I did inform her that Intensive Outpatient Program (for processing grief and working to enhance mood and anxiety stabilization .     She does have a nice compassionate side of her ; yet being overly kind to extent that she may not take care of her own needs appears an area for her to explore / work on (ie, boundaries).    [place] does live in [place] and such may be challenge to frequent commuting for counseling. I did tell her of Telehealth and we will explore setting up for her vi telehealth    Records: I have asked Med assit to attempt recorsd from CM Varghese / [facility]ac / [phone]    References: (reviewed with pt as well):    Anxiety &  phobia workbook by NAS Jewell PhD  (web retailers: used: $ 7-10)    Relaxation stress reduction workbook: JESSEE Araujo PhD ( used: $7-10)    Feeling Good Website: Rajat Armenta MD / www.feelinggood.Tailster website (free)     [place]: Path to Better Sleep : https://www.veterantraining.[place].gov/insomnia/ (free)     Pt expressed appreciation for the visit today and did not have further question at this time though pt  was still informed to:     Call  if problems.    Call / Report Side Effects to Psyc MD     Encouraged to follow up with primary care / Gen Med MD for continued monitoring of general health and wellness.    Understanding was expressed; and no further concerns nor questions were raised at this time.     remember healthy self care:   eat  right  attempt adequate rest   HANDWASHING / encourage such jesus. During this corona virus time   walk or light exercise within reason and as your general med team approves  read or explore any of reference materials / homework mentioned  reach out (I.e.,  connect with)  others who nuture and bring out best in you  avoid risky behaviors  keep your appointments  IF you  cannot make your appt THEN please call or go online to reschedule.  avoid  alcohol and illicit substances.  Look for the positive.  All is often relative-seek balance  Call sooner if needed : [phone]   Call 911 or go to Emergency Room  (ER)  if any acute concerns

## 2020-09-11 ENCOUNTER — TELEPHONE (OUTPATIENT)
Dept: FAMILY MEDICINE | Facility: CLINIC | Age: 65
End: 2020-09-11

## 2020-09-11 NOTE — TELEPHONE ENCOUNTER
----- Message from Ivy Orozco MD sent at 9/10/2020  9:17 PM CDT -----  Regarding: FW: Please Offer Clinic Visit for Assess HTN / BP  Please contact patient to schedule BP follow up. Thank you  ----- Message -----  From: Rajat Richardson MD  Sent: 9/9/2020   8:16 PM CDT  To: Ivy Orozco MD  Subject: Please Offer Clinic Visit for Assess HTN / BP    Nerissalo. Dr Orozco:     Nice to see you today    Than you for taking the time to meet with me to discuss coordination of care for Ms Moody.    Whether she accepts or not, would be helpful if your team would reach out to her and offer an med check slot to address her HTN : 183/105    I did tetl her IF any acute concerns to call 911 or got o ER.    Thank you.    Rajat Richardson DFAPA [facility]

## 2020-09-14 ENCOUNTER — PATIENT MESSAGE (OUTPATIENT)
Dept: RHEUMATOLOGY | Facility: CLINIC | Age: 65
End: 2020-09-14

## 2020-09-15 ENCOUNTER — OFFICE VISIT (OUTPATIENT)
Dept: INTERNAL MEDICINE | Facility: CLINIC | Age: 65
End: 2020-09-15
Payer: MEDICARE

## 2020-09-15 VITALS
HEART RATE: 72 BPM | BODY MASS INDEX: 23.26 KG/M2 | TEMPERATURE: 98 F | DIASTOLIC BLOOD PRESSURE: 88 MMHG | HEIGHT: 66 IN | WEIGHT: 144.75 LBS | SYSTOLIC BLOOD PRESSURE: 128 MMHG | OXYGEN SATURATION: 95 %

## 2020-09-15 DIAGNOSIS — R03.0 ELEVATED BLOOD PRESSURE, SITUATIONAL: ICD-10-CM

## 2020-09-15 PROCEDURE — 3008F PR BODY MASS INDEX (BMI) DOCUMENTED: ICD-10-PCS | Mod: CPTII,S$GLB,, | Performed by: FAMILY MEDICINE

## 2020-09-15 PROCEDURE — 99999 PR PBB SHADOW E&M-EST. PATIENT-LVL V: ICD-10-PCS | Mod: PBBFAC,,, | Performed by: FAMILY MEDICINE

## 2020-09-15 PROCEDURE — 99213 OFFICE O/P EST LOW 20 MIN: CPT | Mod: S$GLB,,, | Performed by: FAMILY MEDICINE

## 2020-09-15 PROCEDURE — 99999 PR PBB SHADOW E&M-EST. PATIENT-LVL V: CPT | Mod: PBBFAC,,, | Performed by: FAMILY MEDICINE

## 2020-09-15 PROCEDURE — 3008F BODY MASS INDEX DOCD: CPT | Mod: CPTII,S$GLB,, | Performed by: FAMILY MEDICINE

## 2020-09-15 PROCEDURE — 99213 PR OFFICE/OUTPT VISIT, EST, LEVL III, 20-29 MIN: ICD-10-PCS | Mod: S$GLB,,, | Performed by: FAMILY MEDICINE

## 2020-09-16 NOTE — ASSESSMENT & PLAN NOTE
Advised likely due to her increased stress; advised to check BP three days per week and keep log to review at follow up in 2 weeks; advised to focus on stress management; encouraged to consider getting help with her yard or to consider downsizing if she feels her home is not manageable; her sister states she has encouraged her to consider a garden home with minimal yard to maintain; return for recheck in 2 weeks.

## 2020-09-16 NOTE — PROGRESS NOTES
"Subjective:       Patient ID: Nirali Moody is a 65 y.o. female.    Chief Complaint: Follow-up    Patient presents to clinic today with her sister, Raimundo. Patient being seen today to follow up elevated blood pressure when see Dr. Richardson, Psychiatry, recently. Patient reports home readings are "all over the place". She reports previously her BPs were 120/70 for years. Her mother had hypertension. She reports home readings up to 180/105. She reports sometimes it is normal. She is not clear when questioned if her readings are mainly elevated or mainly controlled. On discussion, it seems her stress is certainly a factor. She laments that her yard is too big and she is unable to take care of it without her pain from fibromyalgia flaring up. She also laments that doctors here will not fill some of her medications that she was receiving from PCP in [place]. She requests Rx for provigil that she was being prescribed to help her "wake up and get going in the morning". Patient is otherwise without concerns today.      Review of Systems   Constitutional: Negative for chills, fatigue, fever and unexpected weight change.   Eyes: Negative for visual disturbance.   Respiratory: Negative for shortness of breath.    Cardiovascular: Negative for chest pain.   Musculoskeletal: Positive for myalgias.   Neurological: Negative for headaches.   Psychiatric/Behavioral: The patient is nervous/anxious.          Objective:      Physical Exam  Vitals signs reviewed.   Constitutional:       General: She is not in acute distress.     Appearance: She is well-developed.   HENT:      Head: Normocephalic and atraumatic.   Eyes:      General: Lids are normal. No scleral icterus.     Extraocular Movements: Extraocular movements intact.      Conjunctiva/sclera: Conjunctivae normal.      Pupils: Pupils are equal, round, and reactive to light.   Pulmonary:      Effort: Pulmonary effort is normal.   Neurological:      Mental Status: She is alert and " oriented to person, place, and time.      Cranial Nerves: No cranial nerve deficit.      Gait: Gait normal.   Psychiatric:         Mood and Affect: Mood and affect normal.         Assessment:       1. Elevated blood pressure, situational        Plan:     Problem List Items Addressed This Visit     Elevated blood pressure, situational    Current Assessment & Plan     Advised likely due to her increased stress; advised to check BP three days per week and keep log to review at follow up in 2 weeks; advised to focus on stress management; encouraged to consider getting help with her yard or to consider downsizing if she feels her home is not manageable; her sister states she has encouraged her to consider a garden home with minimal yard to maintain; return for recheck in 2 weeks.             Advised I will not refill provigil as discussed at previous visit. Keep appointments with specialist.  Patient and her sister expressed understanding.  Encouraged to schedule appointments with GI, OB/GYN as discussed at last visit.

## 2020-09-21 ENCOUNTER — PATIENT MESSAGE (OUTPATIENT)
Dept: INTERNAL MEDICINE | Facility: CLINIC | Age: 65
End: 2020-09-21

## 2020-09-21 DIAGNOSIS — G43.909 MIGRAINE WITHOUT STATUS MIGRAINOSUS, NOT INTRACTABLE, UNSPECIFIED MIGRAINE TYPE: Primary | ICD-10-CM

## 2020-09-23 ENCOUNTER — TELEPHONE (OUTPATIENT)
Dept: INTERNAL MEDICINE | Facility: CLINIC | Age: 65
End: 2020-09-23

## 2020-09-23 RX ORDER — SUMATRIPTAN 5 MG/1
SPRAY NASAL
Qty: 6 EACH | Refills: 0 | Status: SHIPPED | OUTPATIENT
Start: 2020-09-23 | End: 2020-10-01

## 2020-09-23 RX ORDER — ONDANSETRON HYDROCHLORIDE 8 MG/1
8 TABLET, FILM COATED ORAL DAILY PRN
Qty: 6 TABLET | Refills: 0 | Status: SHIPPED | OUTPATIENT
Start: 2020-09-23 | End: 2020-11-20 | Stop reason: SDUPTHER

## 2020-09-23 NOTE — TELEPHONE ENCOUNTER
Please contact patient to find out what dosing of imitrex nasal spray she was using for migraines. Thank you

## 2020-10-01 ENCOUNTER — OFFICE VISIT (OUTPATIENT)
Dept: INTERNAL MEDICINE | Facility: CLINIC | Age: 65
End: 2020-10-01
Payer: MEDICARE

## 2020-10-01 ENCOUNTER — OFFICE VISIT (OUTPATIENT)
Dept: PSYCHIATRY | Facility: CLINIC | Age: 65
End: 2020-10-01
Payer: MEDICARE

## 2020-10-01 VITALS
OXYGEN SATURATION: 97 % | BODY MASS INDEX: 23.29 KG/M2 | SYSTOLIC BLOOD PRESSURE: 136 MMHG | HEIGHT: 66 IN | TEMPERATURE: 98 F | DIASTOLIC BLOOD PRESSURE: 90 MMHG | WEIGHT: 144.94 LBS | HEART RATE: 87 BPM

## 2020-10-01 DIAGNOSIS — F43.21 GRIEF: ICD-10-CM

## 2020-10-01 DIAGNOSIS — Z63.9 FAMILY DYNAMICS PROBLEM: ICD-10-CM

## 2020-10-01 DIAGNOSIS — R03.0 ELEVATED BLOOD PRESSURE, SITUATIONAL: Primary | ICD-10-CM

## 2020-10-01 DIAGNOSIS — F33.2 MDD (MAJOR DEPRESSIVE DISORDER), RECURRENT SEVERE, WITHOUT PSYCHOSIS: Primary | ICD-10-CM

## 2020-10-01 DIAGNOSIS — G43.909 MIGRAINE WITHOUT STATUS MIGRAINOSUS, NOT INTRACTABLE, UNSPECIFIED MIGRAINE TYPE: ICD-10-CM

## 2020-10-01 DIAGNOSIS — F41.0 PANIC ATTACKS: ICD-10-CM

## 2020-10-01 DIAGNOSIS — T74.11XS PHYSICAL ABUSE OF ADULT BY PARTNER, SEQUELA: ICD-10-CM

## 2020-10-01 DIAGNOSIS — F41.1 GAD (GENERALIZED ANXIETY DISORDER): ICD-10-CM

## 2020-10-01 DIAGNOSIS — F33.1 MAJOR DEPRESSIVE DISORDER, RECURRENT EPISODE, MODERATE: Primary | ICD-10-CM

## 2020-10-01 DIAGNOSIS — M79.7 FIBROMYALGIA: ICD-10-CM
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
## 2020-10-01 DIAGNOSIS — T74.22XS CHILD SEXUAL ABUSE, SEQUELA: ICD-10-CM

## 2020-10-01 PROCEDURE — 99214 PR OFFICE/OUTPT VISIT, EST, LEVL IV, 30-39 MIN: ICD-10-PCS | Mod: S$GLB,,, | Performed by: PSYCHIATRY & NEUROLOGY

## 2020-10-01 PROCEDURE — 90791 PSYCH DIAGNOSTIC EVALUATION: CPT | Mod: S$GLB,,, | Performed by: SOCIAL WORKER

## 2020-10-01 PROCEDURE — 99999 PR PBB SHADOW E&M-EST. PATIENT-LVL IV: CPT | Mod: PBBFAC,,, | Performed by: FAMILY MEDICINE

## 2020-10-01 PROCEDURE — 99999 PR PBB SHADOW E&M-EST. PATIENT-LVL I: CPT | Mod: PBBFAC,,, | Performed by: SOCIAL WORKER

## 2020-10-01 PROCEDURE — 99999 PR PBB SHADOW E&M-EST. PATIENT-LVL IV: ICD-10-PCS | Mod: PBBFAC,,, | Performed by: FAMILY MEDICINE

## 2020-10-01 PROCEDURE — 3008F BODY MASS INDEX DOCD: CPT | Mod: CPTII,S$GLB,, | Performed by: FAMILY MEDICINE

## 2020-10-01 PROCEDURE — 99213 OFFICE O/P EST LOW 20 MIN: CPT | Mod: S$GLB,,, | Performed by: FAMILY MEDICINE

## 2020-10-01 PROCEDURE — 99999 PR PBB SHADOW E&M-EST. PATIENT-LVL I: ICD-10-PCS | Mod: PBBFAC,,, | Performed by: SOCIAL WORKER

## 2020-10-01 PROCEDURE — 3008F PR BODY MASS INDEX (BMI) DOCUMENTED: ICD-10-PCS | Mod: CPTII,S$GLB,, | Performed by: FAMILY MEDICINE

## 2020-10-01 PROCEDURE — 99214 OFFICE O/P EST MOD 30 MIN: CPT | Mod: S$GLB,,, | Performed by: PSYCHIATRY & NEUROLOGY

## 2020-10-01 PROCEDURE — 99213 PR OFFICE/OUTPT VISIT, EST, LEVL III, 20-29 MIN: ICD-10-PCS | Mod: S$GLB,,, | Performed by: FAMILY MEDICINE

## 2020-10-01 PROCEDURE — 90791 PR PSYCHIATRIC DIAGNOSTIC EVALUATION: ICD-10-PCS | Mod: S$GLB,,, | Performed by: SOCIAL WORKER

## 2020-10-01 RX ORDER — SUMATRIPTAN SUCCINATE 100 MG/1
TABLET ORAL
Qty: 9 TABLET | Refills: 1 | Status: SHIPPED | OUTPATIENT
Start: 2020-10-01 | End: 2020-11-20 | Stop reason: SDUPTHER

## 2020-10-01 RX ORDER — ARIPIPRAZOLE 2 MG/1
2 TABLET ORAL DAILY
Qty: 30 TABLET | Refills: 1 | Status: SHIPPED | OUTPATIENT
Start: 2020-10-01 | End: 2020-10-01

## 2020-10-01 RX ORDER — ARIPIPRAZOLE 2 MG/1
2 TABLET ORAL DAILY
Qty: 30 TABLET | Refills: 1 | Status: SHIPPED | OUTPATIENT
Start: 2020-10-01 | End: 2020-10-13

## 2020-10-01 SDOH — SOCIAL DETERMINANTS OF HEALTH (SDOH): PROBLEM RELATED TO PRIMARY SUPPORT GROUP, UNSPECIFIED: Z63.9
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
## 2020-10-01 NOTE — PROGRESS NOTES
Subjective:       Patient ID: Nirali Moody is a 65 y.o. female.    Chief Complaint: Follow-up    Patient presents to clinic today for followup of blood pressure. Home log shows BPs 120-140s/80-90s. Single reading of 140/110. Requests Rx for oral imitrex 100 mg, reports nasal spray is too expensive.    Review of Systems   Constitutional: Negative for chills, fatigue, fever and unexpected weight change.   Eyes: Negative for visual disturbance.   Respiratory: Negative for shortness of breath.    Cardiovascular: Negative for chest pain.   Musculoskeletal: Negative for myalgias.   Neurological: Negative for headaches.         Objective:      Physical Exam  Vitals signs reviewed.   Constitutional:       General: She is not in acute distress.     Appearance: She is well-developed.   HENT:      Head: Normocephalic and atraumatic.   Eyes:      General: Lids are normal. No scleral icterus.     Extraocular Movements: Extraocular movements intact.      Conjunctiva/sclera: Conjunctivae normal.      Pupils: Pupils are equal, round, and reactive to light.   Pulmonary:      Effort: Pulmonary effort is normal.   Neurological:      Mental Status: She is alert and oriented to person, place, and time.      Cranial Nerves: No cranial nerve deficit.      Gait: Gait normal.   Psychiatric:         Mood and Affect: Mood and affect normal.         Assessment:       1. Elevated blood pressure, situational    2. Migraine without status migrainosus, not intractable, unspecified migraine type        Plan:     Problem List Items Addressed This Visit     Elevated blood pressure, situational - Primary    Current Assessment & Plan     Borderline, improving, home readings overall are reasonable; elevations situational, no medication at this time, will montior         Migraine without status migrainosus, not intractable    Current Assessment & Plan     Advised to take one dose of imitrex, can discuss further with Neurology at upcoming appointment          Relevant Medications    sumatriptan (IMITREX) 100 MG tablet

## 2020-10-01 NOTE — ASSESSMENT & PLAN NOTE
Borderline, improving, home readings overall are reasonable; elevations situational, no medication at this time, will montior

## 2020-10-11 ENCOUNTER — PATIENT MESSAGE (OUTPATIENT)
Dept: PSYCHIATRY | Facility: CLINIC | Age: 65
End: 2020-10-11

## 2020-10-11 ENCOUNTER — PATIENT MESSAGE (OUTPATIENT)
Dept: RHEUMATOLOGY | Facility: CLINIC | Age: 65
End: 2020-10-11

## 2020-10-13 ENCOUNTER — TELEPHONE (OUTPATIENT)
Dept: RHEUMATOLOGY | Facility: CLINIC | Age: 65
End: 2020-10-13

## 2020-10-13 ENCOUNTER — PATIENT OUTREACH (OUTPATIENT)
Dept: ADMINISTRATIVE | Facility: HOSPITAL | Age: 65
End: 2020-10-13

## 2020-10-13 NOTE — TELEPHONE ENCOUNTER
----- Message from Melissa Araujo sent at 10/13/2020 12:23 PM CDT -----  Contact: Nirali Campoverde would like a call back at [phone], Regards to if her records was received.     Thanks  Td

## 2020-10-13 NOTE — PROGRESS NOTES
Spoke with Thomas who stated they have tried faxing, but it failed. The colonoscopy will be sent by mail. I also asked her to try and fax again. I gave her the fax number again. She would send today.

## 2020-10-13 NOTE — TELEPHONE ENCOUNTER
Spoke with pt and advised her that we received some records from her physician in [facility] such as labs and office notes. Pt states that she saw the Neurologist Dr. Krueger and the testing he did does not match all the testing she has done in [facility] and it is now showing there is nothing wrong with her. Pt would like us to contact her previous MD Colleennn to have all her testing sent to Dr. Krueger's office. Advised patient that if Dr. Krueger is needing her previous records he would be the one to request those records.

## 2020-10-14 ENCOUNTER — OFFICE VISIT (OUTPATIENT)
Dept: OPHTHALMOLOGY | Facility: CLINIC | Age: 65
End: 2020-10-14
Payer: MEDICARE

## 2020-10-14 ENCOUNTER — PATIENT OUTREACH (OUTPATIENT)
Dept: ADMINISTRATIVE | Facility: HOSPITAL | Age: 65
End: 2020-10-14

## 2020-10-14 ENCOUNTER — PATIENT OUTREACH (OUTPATIENT)
Dept: ADMINISTRATIVE | Facility: OTHER | Age: 65
End: 2020-10-14

## 2020-10-14 ENCOUNTER — OFFICE VISIT (OUTPATIENT)
Dept: OBSTETRICS AND GYNECOLOGY | Facility: CLINIC | Age: 65
End: 2020-10-14
Payer: MEDICARE

## 2020-10-14 VITALS
HEIGHT: 66 IN | WEIGHT: 147.69 LBS | SYSTOLIC BLOOD PRESSURE: 138 MMHG | DIASTOLIC BLOOD PRESSURE: 78 MMHG | BODY MASS INDEX: 23.74 KG/M2

## 2020-10-14 DIAGNOSIS — N95.2 ATROPHIC VAGINITIS: Primary | ICD-10-CM

## 2020-10-14 DIAGNOSIS — N94.9 VAGINAL SYMPTOM: ICD-10-CM

## 2020-10-14 DIAGNOSIS — H25.13 CATARACT, NUCLEAR SCLEROTIC SENILE, BILATERAL: Primary | ICD-10-CM

## 2020-10-14 DIAGNOSIS — H52.4 REGULAR ASTIGMATISM WITH PRESBYOPIA, BILATERAL: ICD-10-CM

## 2020-10-14 DIAGNOSIS — Z83.518 FAMILY HISTORY OF MACULAR DEGENERATION: ICD-10-CM

## 2020-10-14 DIAGNOSIS — Z83.511 FAMILY HISTORY OF GLAUCOMA IN MOTHER: ICD-10-CM

## 2020-10-14 DIAGNOSIS — Z12.31 ENCOUNTER FOR SCREENING MAMMOGRAM FOR MALIGNANT NEOPLASM OF BREAST: Primary | ICD-10-CM

## 2020-10-14 DIAGNOSIS — H40.013 AT LOW RISK FOR OPEN-ANGLE GLAUCOMA IN BOTH EYES: ICD-10-CM

## 2020-10-14 DIAGNOSIS — Z83.511 FAMILY HISTORY OF GLAUCOMA IN SISTER: ICD-10-CM

## 2020-10-14 DIAGNOSIS — H53.9 VISION CHANGES: ICD-10-CM

## 2020-10-14 DIAGNOSIS — H52.223 REGULAR ASTIGMATISM WITH PRESBYOPIA, BILATERAL: ICD-10-CM

## 2020-10-14 PROCEDURE — 99999 PR PBB SHADOW E&M-EST. PATIENT-LVL IV: ICD-10-PCS | Mod: PBBFAC,,, | Performed by: OBSTETRICS & GYNECOLOGY

## 2020-10-14 PROCEDURE — 99204 PR OFFICE/OUTPT VISIT, NEW, LEVL IV, 45-59 MIN: ICD-10-PCS | Mod: S$GLB,,, | Performed by: OBSTETRICS & GYNECOLOGY

## 2020-10-14 PROCEDURE — 92015 DETERMINE REFRACTIVE STATE: CPT | Mod: S$GLB,,, | Performed by: OPTOMETRIST

## 2020-10-14 PROCEDURE — 92004 COMPRE OPH EXAM NEW PT 1/>: CPT | Mod: S$GLB,,, | Performed by: OPTOMETRIST

## 2020-10-14 PROCEDURE — 99204 OFFICE O/P NEW MOD 45 MIN: CPT | Mod: S$GLB,,, | Performed by: OBSTETRICS & GYNECOLOGY

## 2020-10-14 PROCEDURE — 92015 PR REFRACTION: ICD-10-PCS | Mod: S$GLB,,, | Performed by: OPTOMETRIST

## 2020-10-14 PROCEDURE — 3008F PR BODY MASS INDEX (BMI) DOCUMENTED: ICD-10-PCS | Mod: CPTII,S$GLB,, | Performed by: OBSTETRICS & GYNECOLOGY

## 2020-10-14 PROCEDURE — 99999 PR PBB SHADOW E&M-EST. PATIENT-LVL IV: CPT | Mod: PBBFAC,,, | Performed by: OBSTETRICS & GYNECOLOGY

## 2020-10-14 PROCEDURE — 92004 PR EYE EXAM, NEW PATIENT,COMPREHESV: ICD-10-PCS | Mod: S$GLB,,, | Performed by: OPTOMETRIST

## 2020-10-14 PROCEDURE — 99999 PR PBB SHADOW E&M-EST. PATIENT-LVL III: CPT | Mod: PBBFAC,,, | Performed by: OPTOMETRIST

## 2020-10-14 PROCEDURE — 99999 PR PBB SHADOW E&M-EST. PATIENT-LVL III: ICD-10-PCS | Mod: PBBFAC,,, | Performed by: OPTOMETRIST

## 2020-10-14 PROCEDURE — 3008F BODY MASS INDEX DOCD: CPT | Mod: CPTII,S$GLB,, | Performed by: OBSTETRICS & GYNECOLOGY

## 2020-10-14 RX ORDER — MODAFINIL 200 MG/1
TABLET ORAL
COMMUNITY
End: 2020-11-17

## 2020-10-14 RX ORDER — ESTRADIOL 10 UG/1
1 INSERT VAGINAL
Qty: 8 TABLET | Refills: 11 | Status: SHIPPED | OUTPATIENT
Start: 2020-10-15 | End: 2020-11-17

## 2020-10-14 NOTE — PROGRESS NOTES
HPI     Eye Pain      Additional comments: OU              Eye Problem      Additional comments: vision changes              Comments     New Patient  Last Exam 1.5 years  Patient states that she has pain in both her eyes and primarily the left   side. She says her peripheral vision is gone and she has a large brown   spot that comes at her and blocks off her vision. She has trouble reading   and when she drives she looses some of her vision. She has been having   pain in her eyes the past 3 months and she feels a pulling   sensation/pressure in the back of her eyes. The visual changes has been   going on for 5+ months. She has Migraine with aura and migraines in her   eye. She has trouble with lights they make her dizzy and she cant even   browse for anything in the store because it hurts. Patient fell 4-5 months   ago on the left side and it her head first and she never went to the dr   following that. She has neurological problems with fibromyalgia  that she   thinks is associated with her symptoms. Rates pain 7/10 and its very   constant. Never been diagnosed with glaucoma, cataracts, AMD or had a   retinal detachment.  No Ocular Sx  Fhx of cataracts & glaucoma (mother, sister, & brother), wet macular   degeneration (1st cousin) & cataracts (father)            Last edited by Burke Lala, SEBASTIEN on 10/14/2020  1:19 PM. (History)            Assessment /Plan     For exam results, see Encounter Report.    Cataract, nuclear sclerotic senile, bilateral    Vision changes  -     Ambulatory referral/consult to Optometry    At low risk for open-angle glaucoma in both eyes    Family history of glaucoma in mother    Family history of glaucoma in sister    Family history of macular degeneration    Regular astigmatism with presbyopia, bilateral      Mild cataracts OU, not surgical.    No improvement in vision with refraction.    Consult Dr Luo for macula evaluation and glaucoma evaluation.

## 2020-10-14 NOTE — PROGRESS NOTES
Subjective:       Patient ID: Nirali Moody is a 65 y.o. female.    Chief Complaint:  Vaginal Prolapse      History of Present Illness  HPI  Presents with vaginal discomfort.  Patient is .  Has hx of hysterectomy/BSO in  for uterine prolapse.  She has not been sexually active in several years.  Her  recently , but prior to that, she took care of him.  He had vascular dementia, and she had to carry him a lot.  She works outdoors a good bit, and does heavy lifting.  Towards the end of the day, she has increased pelvic pressure and pain, and possible bulge vs closure of the vaginal introitus.  No difficulty having BM's.  No urinary incontinence or voiding difficulty.    GYN & OB History  No LMP recorded. Patient has had a hysterectomy.   Date of Last Pap: No result found    OB History    Para Term  AB Living   3             SAB TAB Ectopic Multiple Live Births         1        # Outcome Date GA Lbr Usman/2nd Weight Sex Delivery Anes PTL Lv   3A             3B             2             1                 Review of Systems  Review of Systems   Constitutional: Negative for fatigue, fever and unexpected weight change.   Gastrointestinal: Negative for abdominal pain, constipation, diarrhea, nausea and vomiting.   Genitourinary: Positive for pelvic pain and vaginal pain. Negative for bladder incontinence, dysuria, frequency, urgency, vaginal bleeding, vaginal discharge, urinary incontinence and vaginal odor.           Objective:    Physical Exam:   Constitutional: She is oriented to person, place, and time. She appears well-developed and well-nourished. No distress.             Abdominal: Soft. She exhibits no distension and no mass. There is no abdominal tenderness. There is no rebound and no guarding. Hernia confirmed negative in the right inguinal area and confirmed negative in the left inguinal area.     Genitourinary:    Vagina normal.      Pelvic exam was  performed with patient supine.   There is no rash, tenderness, lesion or injury on the right labia. There is no rash, tenderness, lesion or injury on the left labia. Uterus is absent. Right adnexum displays no mass, no tenderness and no fullness. Left adnexum displays no mass, no tenderness and no fullness. No erythema (mucosa pale, thin, and atrophic), tenderness, bleeding, rectocele, cystocele or unspecified prolapse of vaginal walls in the vagina.    No foreign body in the vagina.      No signs of injury in the vagina.   Cervix exhibits absence. negative for vaginal discharge              Neurological: She is alert and oriented to person, place, and time.     Psychiatric: She has a normal mood and affect.          Assessment:        1. Atrophic vaginitis    2. Vaginal symptom                Plan:      Nirali was seen today for vaginal prolapse.    Diagnoses and all orders for this visit:    Atrophic vaginitis  -     estradioL (VAGIFEM) 10 mcg Tab; Place 1 tablet (10 mcg total) vaginally twice a week.    Vaginal symptom  -     Ambulatory referral/consult to Obstetrics / Gynecology    Vagina with good apical support.  No cystocele or rectocele.  Reviewed vaginal atrophy and  syndrome of menopause.  Discussed topical vaginal estrogen, and she'd like to try that.  Can use EVOO as lubricant if needed.  RTC 1 year.

## 2020-10-14 NOTE — PROGRESS NOTES
Health Maintenance Due   Topic Date Due    Hepatitis C Screening  1955    Lipid Panel  1955    HIV Screening  01/04/1972    Mammogram  01/04/1997    Colorectal Cancer Screening  01/04/2007     Updates were requested from care everywhere.  Chart was reviewed for overdue Proactive [facility] Encounters ([facility]) topics (CRS, Breast Cancer Screening, Eye exam)  Health Maintenance has been updated.  LINKS immunization registry triggered.  Immunizations were reconciled.  Order placed for mammogram.

## 2020-10-14 NOTE — PATIENT INSTRUCTIONS
Mild cataracts OU, not surgical.    No improvement in vision with refraction.    Consult Dr Luo for macula evaluation and glaucoma evaluation.

## 2020-10-14 NOTE — PATIENT INSTRUCTIONS
Atrophic Vaginitis    Atrophic vaginitis means the walls of your vagina have become thin. This happens when your body makes too little of the hormone estrogen. Menopause or surgical removal of the ovaries are the most common causes for a drop in estrogen. Breastfeeding can also cause the hormone level to drop.  Symptoms of atrophic vaginitis include:  · Dryness, soreness, burning, or itching in the vagina  · Vaginal discharge  Sex can be uncomfortable, even painful. After sex, you may have bleeding from your vagina. You may also have burning or pain when you urinate.  Home care  Your healthcare provider may recommend 1 or more of these as treatment:  · Vaginal creams, lotions, and lubricants. These products help relieve vaginal dryness. They dont need a prescription. They can be found in the personal care section of most pharmacies. Creams and lotions are used daily to help keep the vagina moist. Lubricants help reduce dryness and pain during sex. Choose water-based lubricants. Dont use petroleum jelly, mineral oil, or other oils. These increase the chance of infection.   · Hormone therapy (HT). HT increases the amount of estrogen in your body. This can help manage or relieve symptoms. HT can be given in pill form. It may be given as a lotion, cream, ring put into the vagina, or a patch on the skin. The risks and benefits of HT vary for each woman. For instance, your risk may be higher if you have had breast cancer. Discuss this treatment with your healthcare provider. Not every woman can use HT.  You dont need to give up (abstain from) sex. In fact, regular sex can help keep vaginal tissues healthy. Take steps to make sex more comfortable by using water-based lubricants.  Preventing infections  Atrophic vaginitis makes an infection of the vagina or the urinary tract more likely. To help reduce your risk:  · Keep your genitals clean. When you bathe, wash the outside of your vagina with mild soap and water.  Clean gently between the folds of your vagina.  · Wipe from front to back after a bowel movement.  · Dont douche unless your healthcare provider tells you to.  · Avoid scented toilet paper, scented vaginal sprays, and scented tampons.  · Avoid wearing clothes that are tight in the crotch. These include pantyhose, jeans, and leggings. Wear cotton underwear. Change it every day.  Follow-up care  Follow up with your healthcare provider, or as advised.  When to seek medical advice  Call your health care provider right away if any of these occur:  · Fever of 100.4°F (38°C) or higher, or as directed by your healthcare provider  · Symptoms dont go away or get worse even with treatment  · Vaginal area swells or becomes painful  · Vaginal area bleeds, but not because of your period  · Bad-smelling discharge from the vagina  · Pain or burning when you urinate or you have trouble passing urine  · Open sores develop around vagina   Date Last Reviewed: 12/1/2016  © 5300-7200 [employer]. [address] [place] [address], [place] [address]. All rights reserved. This information is not intended as a substitute for professional medical care. Always follow your healthcare professional's instructions.

## 2020-10-14 NOTE — LETTER
October 14, 2020      Ivy Orozco MD  [address] [place] [address] Dr Frantz CARRION [id number]           [facility]  [address] [place] [address] JAMES CARRION [address]  Phone: [phone]  Fax: [phone]          Patient: Nirali Moody   MR Number: [id number]   YOB: 1955   Date of Visit: 10/14/2020       Dear Dr. Ivy Orozco:    Thank you for referring Nirali Moody to me for evaluation. Attached you will find relevant portions of my assessment and plan of care.    If you have questions, please do not hesitate to call me. I look forward to following Nirali Moody along with you.    Sincerely,    Kimberly Graf MD    Enclosure  CC:  No Recipients    If you would like to receive this communication electronically, please contact [email or web address] or [phone] to request more information on [employer] Link access.    For providers and/or their staff who would like to refer a patient to [facility], please contact us through our one-stop-shop provider referral line, [facility] , at [phone].    If you feel you have received this communication in error or would no longer like to receive these types of communications, please e-mail [email or web address]

## 2020-10-14 NOTE — PROGRESS NOTES
Per Dr Hwang office in [place] last mammo was 2017. Offered to schedule appt. She declined saying she is not doing that. Also asked regarding colon. States was done per Dr Stuart Flowers . Will try to get report.

## 2020-10-14 NOTE — LETTER
October 14, 2020      Ivy Orozco MD  [address] [place] [address] Dr Frantz CARRION [address]           [facility]  [address] [place] [address] JAMES CARRION [address]  Phone: [phone]  Fax: [phone]          Patient: Nirali Moody   MR Number: [id number]   YOB: 1955   Date of Visit: 10/14/2020       Dear Dr. Ivy Orozco:    Thank you for referring Nirali Moody to me for evaluation. Attached you will find relevant portions of my assessment and plan of care.    If you have questions, please do not hesitate to call me. I look forward to following Nirali Moody along with you.    Sincerely,    Burke Lala, OD    Enclosure  CC:  No Recipients    If you would like to receive this communication electronically, please contact [email or web address] or [phone] to request more information on [employer] Link access.    For providers and/or their staff who would like to refer a patient to [facility], please contact us through our one-stop-shop provider referral line, [facility], at [phone].    If you feel you have received this communication in error or would no longer like to receive these types of communications, please e-mail [email or web address]

## 2020-10-20 NOTE — PROGRESS NOTES
Still no colonoscopy results from Dr. Flowers. Will forward to [facility].

## 2020-10-22 ENCOUNTER — OFFICE VISIT (OUTPATIENT)
Dept: PSYCHIATRY | Facility: CLINIC | Age: 65
End: 2020-10-22
Payer: COMMERCIAL

## 2020-10-22 DIAGNOSIS — F33.2 MDD (MAJOR DEPRESSIVE DISORDER), RECURRENT SEVERE, WITHOUT PSYCHOSIS: ICD-10-CM

## 2020-10-22 DIAGNOSIS — T74.11XS PHYSICAL ABUSE OF ADULT BY PARTNER, SEQUELA: ICD-10-CM

## 2020-10-22 DIAGNOSIS — T74.22XS CHILD SEXUAL ABUSE, SEQUELA: ICD-10-CM

## 2020-10-22 DIAGNOSIS — F43.21 GRIEF: Primary | ICD-10-CM
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
## 2020-10-22 DIAGNOSIS — Z63.9 FAMILY DYNAMICS PROBLEM: ICD-10-CM

## 2020-10-22 DIAGNOSIS — M79.7 FIBROMYALGIA: ICD-10-CM

## 2020-10-22 PROCEDURE — 99214 PR OFFICE/OUTPT VISIT, EST, LEVL IV, 30-39 MIN: ICD-10-PCS | Mod: 95,,, | Performed by: PSYCHIATRY & NEUROLOGY

## 2020-10-22 PROCEDURE — 99214 OFFICE O/P EST MOD 30 MIN: CPT | Mod: 95,,, | Performed by: PSYCHIATRY & NEUROLOGY

## 2020-10-22 RX ORDER — DULOXETIN HYDROCHLORIDE 60 MG/1
60 CAPSULE, DELAYED RELEASE ORAL EVERY MORNING
Qty: 30 CAPSULE | Refills: 2 | Status: SHIPPED | OUTPATIENT
Start: 2020-10-22 | End: 2021-01-20

## 2020-10-22 RX ORDER — ESCITALOPRAM OXALATE 20 MG/1
20 TABLET ORAL EVERY MORNING
Qty: 30 TABLET | Refills: 2 | Status: SHIPPED | OUTPATIENT
Start: 2020-10-22 | End: 2021-01-20

## 2020-10-22 RX ORDER — DULOXETIN HYDROCHLORIDE 30 MG/1
30 CAPSULE, DELAYED RELEASE ORAL EVERY MORNING
Qty: 30 CAPSULE | Refills: 2 | Status: SHIPPED | OUTPATIENT
Start: 2020-10-22 | End: 2021-01-20

## 2020-10-22 SDOH — SOCIAL DETERMINANTS OF HEALTH (SDOH): PROBLEM RELATED TO PRIMARY SUPPORT GROUP, UNSPECIFIED: Z63.9
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
## 2020-10-22 NOTE — PATIENT INSTRUCTIONS
PLAN:     Follow up with [facility] MISAEL Post MD TELEHEALTH Dec 3rd or 7 or 8th     Jake Hurd LCSW 11-2-2020 at 1 pm TELEHEALTH     Lamictal was stopped as headaches ; added as allergy ; as intolerant    Cymbalta advanced from 60 mg to 90 mg; remaisn lexapro 20 mg    Note: Now on THC Tincture tapering up utilizing  and 600 as Rx by RADHA Dong MD Neuro med center    Note she will see Dr Ortiz / [facility] Dec 3 at 11 am  (Previously she was getting PROVIGIL from Neurologist; she will ask Dr Ortiz about such  As well as she makes very vague references re: not as sharp cognitively; she will review such with him +/- referrals for any neuropsyc testing)     Understanding Expressed    Continue continuity are Dr Orozco PCP    She indicated that Rheumatology Dr Cortés is following and has already seen and following.     She does have a nice compassionate side of her ; yet being overly kind to extent that she may not take care of her own needs appears an area for her to explore / work on (ie, boundaries).    Records: I have asked Med assit to attempt recorsd from CM Varghese / [facility] prac / [phone]    References: (reviewed with pt as well):    Anxiety &  phobia workbook by NAS Jewell PhD  (web retailers: used: $ 7-10)    Relaxation stress reduction workbook: JESSEE Araujo PhD ( used: $7-10)    Feeling Good Website: Rajat Armenta MD / [email or web address].com website (free); jesus. PODCASTS     [place]: Path to Better Sleep : https://www.veterantraining.[place].gov/insomnia/ (free)     Pt expressed appreciation for the visit today and did not have further question at this time though pt  was still informed to:     Call  if problems.    Call / Report Side Effects to Psyc MD     Encouraged to follow up with primary care / Gen Med MD for continued monitoring of general health and wellness.    Understanding was expressed; and no further concerns nor questions were raised at  this time.     remember healthy self care:   eat right  attempt adequate rest   HANDWASHING / encourage such jesus. During this corona virus time   walk or light exercise within reason and as your general med team approves  read or explore any of reference materials / homework mentioned  reach out (I.e.,  connect with)  others who nuture and bring out best in you  avoid risky behaviors  keep your appointments  IF you  cannot make your appt THEN please call or go online to reschedule.  avoid  alcohol and illicit substances.  Look for the positive.  All is often relative-seek balance  Call sooner if needed : [phone]   Call 911 or go to Emergency Room  (ER)  if any acute concerns

## 2020-11-02 ENCOUNTER — OFFICE VISIT (OUTPATIENT)
Dept: PSYCHIATRY | Facility: CLINIC | Age: 65
End: 2020-11-02
Payer: COMMERCIAL

## 2020-11-02 DIAGNOSIS — F43.21 GRIEF: ICD-10-CM

## 2020-11-02 DIAGNOSIS — F33.1 MAJOR DEPRESSIVE DISORDER, RECURRENT EPISODE, MODERATE: Primary | ICD-10-CM

## 2020-11-02 DIAGNOSIS — Z63.9 FAMILY DYNAMICS PROBLEM: ICD-10-CM

## 2020-11-02 DIAGNOSIS — F41.0 PANIC ATTACKS: ICD-10-CM

## 2020-11-02 PROCEDURE — 90834 PR PSYCHOTHERAPY W/PATIENT, 45 MIN: ICD-10-PCS | Mod: 95,,, | Performed by: SOCIAL WORKER

## 2020-11-02 PROCEDURE — 90834 PSYTX W PT 45 MINUTES: CPT | Mod: 95,,, | Performed by: SOCIAL WORKER

## 2020-11-02 SDOH — SOCIAL DETERMINANTS OF HEALTH (SDOH): PROBLEM RELATED TO PRIMARY SUPPORT GROUP, UNSPECIFIED: Z63.9
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
## 2020-11-02 NOTE — PROGRESS NOTES
"Individual Psychotherapy (PhD/LCSW)    11/2/2020    Site:  [facility]          --Via virtual visit with synchronous audio and video.  Patient presented at home, in the state [facility].   Each patient to whom medical services by telemedicine is provided is:  (1) informed of the relationship between the physician and patient and the respective role of any other health care provider with respect to management of the patient; and (2) notified that he or she may decline to receive medical services by telemedicine and may withdraw from such care at any time.    Therapeutic Intervention: Met with patient.  Outpatient - Insight oriented psychotherapy 45 min - CPT code 82837 and Outpatient - Supportive psychotherapy 45 min - CPT Code 69782    Chief complaint/reason for encounter: depression, anxiety, sleep and interpersonal     Interval history and content of current session:   65 year old female patient returned for follow up psychotherapy via virtual visit from home.  Patient reported some health issues in the interim past month; notably elevated blood pressure, which improved when she was taken off of another medication.  Said she is now on only one medication, plus medical marijuana, which she said has allowed her to tolerate not being on pain medications.  Talked about her daughters, Zenaida and Amanda.  Limited contact from Amanda, mostly initiated by Amanda, who does not respond much to patient attempted to reach out to her.  Stated daughter Zenaida seemed to have utterly changed personality sometime during the period the pateint was living in [place]. She was there for 14 years and did have visits from them.  But somewhere during then, Zenaida stopped going to visit her.  Stated her other daughter, Amanda, even commented to the patient that her sister "seems like a sociopath."  Reports Zenaida seems to have lost empathy for most living things.  Patient voiced some uncertainty about boundary maintenance, questioning " when standing up for oneself crosses over into antagonizing another person.  Discussed basic respectful assertive communication principles.  Also talked about leisure interests.  Patient loves her animals, having worked for the [employer].  Loves her dogs and cats, but has little else in her day to day routine to draw her attention.  Discussed the value of embracing a spirit of simple curiosity.  Patient revealed she does not always feel confident about how to browse the internet.  Discussed simple ways to work a web search through key word searches.  Patient denied any alcohol use/abuse past.  Said she never did any illegal drugs, though she samples some marijuana when in [facility], where is was legal.  Patient denied any si/hi, psychosis, mood swings, or substance abuse.  Supportive therapy provided.  Plan is to follow up in 2 weeks.     Treatment plan:  · Target symptoms: recurrent depression, anxiety , adjustment, grief  · Why chosen therapy is appropriate versus another modality: relevant to diagnosis, patient responds to this modality  · Outcome monitoring methods: self-report, observation  · Therapeutic intervention type: insight oriented psychotherapy, supportive psychotherapy    Risk parameters:  Patient reports no suicidal ideation  Patient reports no homicidal ideation  Patient reports no self-injurious behavior  Patient reports no violent behavior    Verbal deficits: None    Patient's response to intervention:  The patient's response to intervention is accepting.    Progress toward goals and other mental status changes:  The patient's progress toward goals is limited.    Diagnosis:     ICD-10-CM ICD-9-CM   1. Major depressive disorder, recurrent episode, moderate  F33.1 296.32   2. Grief death father 8-; death  xmas 2019  F43.21 309.0   3. Family dynamics problem  Z63.9 V61.9   4. Panic attacks  F41.0 300.01       Plan:  individual psychotherapy and medication management by  physician    Return to clinic: as scheduled , 11/16/20     Length of Service (minutes): 45

## 2020-11-03 ENCOUNTER — OFFICE VISIT (OUTPATIENT)
Dept: OPHTHALMOLOGY | Facility: CLINIC | Age: 65
End: 2020-11-03
Payer: MEDICARE

## 2020-11-03 DIAGNOSIS — H40.013 OPEN ANGLE WITH BORDERLINE FINDINGS AND LOW GLAUCOMA RISK IN BOTH EYES: Primary | ICD-10-CM

## 2020-11-03 DIAGNOSIS — Z12.11 COLON CANCER SCREENING: Primary | ICD-10-CM

## 2020-11-03 DIAGNOSIS — H04.129 DRYNESS, EYE: ICD-10-CM

## 2020-11-03 PROCEDURE — 92004 PR EYE EXAM, NEW PATIENT,COMPREHESV: ICD-10-PCS | Mod: S$GLB,,, | Performed by: OPHTHALMOLOGY

## 2020-11-03 PROCEDURE — 99999 PR PBB SHADOW E&M-EST. PATIENT-LVL III: ICD-10-PCS | Mod: PBBFAC,,, | Performed by: OPHTHALMOLOGY

## 2020-11-03 PROCEDURE — 92133 CPTRZD OPH DX IMG PST SGM ON: CPT | Mod: S$GLB,,, | Performed by: OPHTHALMOLOGY

## 2020-11-03 PROCEDURE — 76514 ULTRASOUND PACHYMETRY: ICD-10-PCS | Mod: S$GLB,,, | Performed by: OPHTHALMOLOGY

## 2020-11-03 PROCEDURE — 99999 PR PBB SHADOW E&M-EST. PATIENT-LVL III: CPT | Mod: PBBFAC,,, | Performed by: OPHTHALMOLOGY

## 2020-11-03 PROCEDURE — 92004 COMPRE OPH EXAM NEW PT 1/>: CPT | Mod: S$GLB,,, | Performed by: OPHTHALMOLOGY

## 2020-11-03 PROCEDURE — 76514 ECHO EXAM OF EYE THICKNESS: CPT | Mod: S$GLB,,, | Performed by: OPHTHALMOLOGY

## 2020-11-03 PROCEDURE — 92133 POSTERIOR SEGMENT OCT OPTIC NERVE(OCULAR COHERENCE TOMOGRAPHY) - OU - BOTH EYES: ICD-10-PCS | Mod: S$GLB,,, | Performed by: OPHTHALMOLOGY

## 2020-11-03 RX ORDER — CYCLOSPORINE 0.5 MG/ML
1 EMULSION OPHTHALMIC 2 TIMES DAILY
Qty: 60 VIAL | Refills: 11 | Status: SHIPPED | OUTPATIENT
Start: 2020-11-03 | End: 2021-11-03

## 2020-11-03 RX ORDER — LOTEPREDNOL ETABONATE 5 MG/ML
1 SUSPENSION/ DROPS OPHTHALMIC 2 TIMES DAILY
Qty: 10 ML | Refills: 2 | Status: SHIPPED | OUTPATIENT
Start: 2020-11-03 | End: 2020-11-04

## 2020-11-03 NOTE — PROGRESS NOTES
"    ===============================  Nirali Moody,  11/3/2020 today   65 y.o. female   Last visit [facility]: :Visit date not found   Last visit eye dept. Visit date not found  [facility]:  Corrected distance visual acuity was 20/60 +1 in the right eye and 20/60 +1 in the left eye.  Tonometry     Tonometry (Applanation, 11:42 AM)       Right Left    Pressure 21 21               Not recorded         Not recorded         Not recorded        Chief Complaint   Patient presents with    Glaucoma Suspect     ref by dr guerrero.      Ophthalmic Medications     Ophthalmic - Anti-inflammatory, Glucocorticoids Start End     loteprednol (LOTEMAX) 0.5 % ophthalmic suspension    11/3/2020 1/2/2021    Sig: Apply 1 drop to eye 2 (two) times a day.    Route: Ophthalmic        ________________  11/3/2020 today  HPI     Glaucoma Suspect      Additional comments: ref by dr guerrero.               Comments     Optic migraines  OD floater   AMD  Family History of COAG          Last edited by AMADEO Palacios on 11/3/2020 11:35 AM. (History)      Problem List Items Addressed This Visit     None      Visit Diagnoses     Open angle with borderline findings and low glaucoma risk in both eyes    -  Primary    Relevant Orders    Ultrasound pachymetry (Completed)    Posterior Segment OCT Optic Nerve- Both eyes (Completed)    Waters Visual Field - OU - Extended - Both Eyes    Dryness, eye        Relevant Medications    loteprednol (LOTEMAX) 0.5 % ophthalmic suspension    cycloSPORINE (RESTASIS) 0.05 % ophthalmic emulsion        Moct ou nl, goct nl  headache after last visit  "missing pieces of my vsion"  Worse at near cant read alt all - eyes water and itch    burns behind them   Pain after last eye exam  "raw flesh behind then  Eyes  after erading "  U=huirts to pay bills  rec ats qid  X 3 weeks  restasis bid ou to continue, lotemax bid 2 month  Cct+6, rnfl nl, gcl nl  Oct ok good!    Need lots fo light     Recommend rtc 10 weeks for vf iop check, " check symptoms and dry eye after  3 weeks of at's qid, lotemax, and restasis    .      ===========================

## 2020-11-04 ENCOUNTER — TELEPHONE (OUTPATIENT)
Dept: OPHTHALMOLOGY | Facility: CLINIC | Age: 65
End: 2020-11-04

## 2020-11-04 RX ORDER — PREDNISOLONE ACETATE 10 MG/ML
SUSPENSION/ DROPS OPHTHALMIC
Qty: 10 ML | Refills: 3 | Status: SHIPPED | OUTPATIENT
Start: 2020-11-04

## 2020-11-04 RX ORDER — FLUOROMETHOLONE 1 MG/ML
1 SUSPENSION/ DROPS OPHTHALMIC 2 TIMES DAILY PRN
Qty: 10 ML | Refills: 3 | Status: SHIPPED | OUTPATIENT
Start: 2020-11-04 | End: 2020-11-04

## 2020-11-04 NOTE — TELEPHONE ENCOUNTER
Lotemax $200 on insurance.  Discontinued Lotemax rx, recommend FML BID 60 days.  Rx. Sent to [facility] in [facility] [address].  I called and left message on voicemail for patient letting her know.

## 2020-11-05 NOTE — TELEPHONE ENCOUNTER
----- Message from Rogers Doyle sent at 11/5/2020 11:57 AM CST -----  ..Type:  Pharmacy Calling to Clarify an RX    Name of Caller: Karis  Pharmacy Name:.    [id number] [address] - [address]  [address] [id number]  Phone: [phone] Fax: [phone]      Prescription Name:Prednisolone 1% eye drops  What do they need to clarify?: Directions  Best Call Back Number:[phone]  Additional Information:

## 2020-11-10 ENCOUNTER — TELEPHONE (OUTPATIENT)
Dept: ENDOSCOPY | Facility: HOSPITAL | Age: 65
End: 2020-11-10

## 2020-11-17 ENCOUNTER — OFFICE VISIT (OUTPATIENT)
Dept: INTERNAL MEDICINE | Facility: CLINIC | Age: 65
End: 2020-11-17
Payer: MEDICARE

## 2020-11-17 VITALS — DIASTOLIC BLOOD PRESSURE: 78 MMHG | SYSTOLIC BLOOD PRESSURE: 109 MMHG

## 2020-11-17 DIAGNOSIS — R03.0 ELEVATED BLOOD PRESSURE, SITUATIONAL: ICD-10-CM

## 2020-11-17 PROCEDURE — 99213 PR OFFICE/OUTPT VISIT, EST, LEVL III, 20-29 MIN: ICD-10-PCS | Mod: 95,,, | Performed by: FAMILY MEDICINE

## 2020-11-17 PROCEDURE — 99213 OFFICE O/P EST LOW 20 MIN: CPT | Mod: 95,,, | Performed by: FAMILY MEDICINE

## 2020-11-17 NOTE — PROGRESS NOTES
"Subjective:       Patient ID: Nirali Moody is a 65 y.o. female.    Chief Complaint: Hypertension      The patient location is: home  The chief complaint leading to consultation is: hypertension    Visit type: audiovisual    Face to Face time with patient: 5 minutes (chart review started 12:44 pm; video started 12:46 pm; video ended 12:51 pm)  7 minutes of total time spent on the encounter, which includes face to face time and non-face to face time preparing to see the patient (eg, review of tests), Obtaining and/or reviewing separately obtained history, Documenting clinical information in the electronic or other health record, Independently interpreting results (not separately reported) and communicating results to the patient/family/caregiver, or Care coordination (not separately reported).     Each patient to whom he or she provides medical services by telemedicine is:  (1) informed of the relationship between the physician and patient and the respective role of any other health care provider with respect to management of the patient; and (2) notified that he or she may decline to receive medical services by telemedicine and may withdraw from such care at any time.    Follow up blood pressure. Patient reports blood pressure was "all over the place" until she stopped naltrexone. She reports highest BP since stopping medication was 135/95. Most recnet reading 109/78. Patient is otherwise without concerns today.    Hypertension  This is a new problem. The current episode started more than 1 month ago. The problem has been resolved since onset. The problem is resistant. Associated symptoms include anxiety, blurred vision, chest pain, headaches, malaise/fatigue, neck pain, palpitations and sweats. Pertinent negatives include no orthopnea, peripheral edema, PND or shortness of breath. There are no associated agents to hypertension. Risk factors for coronary artery disease include dyslipidemia and stress. Past " treatments include nothing. The current treatment provides significant improvement. Compliance problems include medication side effects.      Review of Systems   Constitutional: Positive for malaise/fatigue.   Eyes: Positive for blurred vision.   Respiratory: Negative for shortness of breath.    Cardiovascular: Positive for chest pain and palpitations. Negative for orthopnea and PND.   Musculoskeletal: Positive for neck pain.   Neurological: Positive for headaches.         Objective:      Physical Exam  Constitutional:       General: She is not in acute distress.     Appearance: She is well-developed.   HENT:      Head: Normocephalic and atraumatic.   Eyes:      General: Lids are normal. No scleral icterus.     Extraocular Movements: Extraocular movements intact.      Conjunctiva/sclera: Conjunctivae normal.   Pulmonary:      Effort: Pulmonary effort is normal.   Neurological:      Mental Status: She is alert and oriented to person, place, and time.   Psychiatric:         Mood and Affect: Mood and affect normal.         Assessment:       1. Elevated blood pressure, situational        Plan:     Problem List Items Addressed This Visit     Elevated blood pressure, situational    Current Assessment & Plan     Improving since stopping naltrexone; advised to continue to monitor and follow up if persistently 140/90+; Patient expressed understanding.

## 2020-11-18 DIAGNOSIS — F33.2 MDD (MAJOR DEPRESSIVE DISORDER), RECURRENT SEVERE, WITHOUT PSYCHOSIS: ICD-10-CM

## 2020-11-18 NOTE — ASSESSMENT & PLAN NOTE
Improving since stopping naltrexone; advised to continue to monitor and follow up if persistently 140/90+; Patient expressed understanding.

## 2020-11-20 ENCOUNTER — PATIENT MESSAGE (OUTPATIENT)
Dept: INTERNAL MEDICINE | Facility: CLINIC | Age: 65
End: 2020-11-20

## 2020-11-20 DIAGNOSIS — G43.909 MIGRAINE WITHOUT STATUS MIGRAINOSUS, NOT INTRACTABLE, UNSPECIFIED MIGRAINE TYPE: ICD-10-CM

## 2020-11-20 RX ORDER — SUMATRIPTAN SUCCINATE 100 MG/1
TABLET ORAL
Qty: 9 TABLET | Refills: 1 | Status: SHIPPED | OUTPATIENT
Start: 2020-11-20 | End: 2020-12-23

## 2020-11-20 RX ORDER — ONDANSETRON HYDROCHLORIDE 8 MG/1
8 TABLET, FILM COATED ORAL DAILY PRN
Qty: 6 TABLET | Refills: 0 | Status: SHIPPED | OUTPATIENT
Start: 2020-11-20 | End: 2020-12-20

## 2020-11-21 RX ORDER — DULOXETIN HYDROCHLORIDE 60 MG/1
60 CAPSULE, DELAYED RELEASE ORAL EVERY MORNING
Qty: 30 CAPSULE | Refills: 2 | OUTPATIENT
Start: 2020-11-21 | End: 2021-02-19

## 2020-11-21 RX ORDER — ESCITALOPRAM OXALATE 20 MG/1
20 TABLET ORAL EVERY MORNING
Qty: 30 TABLET | Refills: 2 | OUTPATIENT
Start: 2020-11-21 | End: 2021-02-19

## 2020-11-21 RX ORDER — DULOXETIN HYDROCHLORIDE 30 MG/1
30 CAPSULE, DELAYED RELEASE ORAL EVERY MORNING
Qty: 30 CAPSULE | Refills: 2 | OUTPATIENT
Start: 2020-11-21 | End: 2021-02-19

## 2020-11-24 ENCOUNTER — PATIENT MESSAGE (OUTPATIENT)
Dept: PSYCHIATRY | Facility: CLINIC | Age: 65
End: 2020-11-24

## 2020-11-30 ENCOUNTER — PATIENT MESSAGE (OUTPATIENT)
Dept: PSYCHIATRY | Facility: CLINIC | Age: 65
End: 2020-11-30

## 2020-11-30 ENCOUNTER — OFFICE VISIT (OUTPATIENT)
Dept: PSYCHIATRY | Facility: CLINIC | Age: 65
End: 2020-11-30
Payer: COMMERCIAL

## 2020-11-30 DIAGNOSIS — F43.21 GRIEF: ICD-10-CM

## 2020-11-30 DIAGNOSIS — Z63.9 FAMILY DYNAMICS PROBLEM: ICD-10-CM

## 2020-11-30 DIAGNOSIS — F33.1 MAJOR DEPRESSIVE DISORDER, RECURRENT EPISODE, MODERATE: Primary | ICD-10-CM

## 2020-11-30 DIAGNOSIS — F41.0 PANIC ATTACKS: ICD-10-CM

## 2020-11-30 PROCEDURE — 99999 PR PBB SHADOW E&M-EST. PATIENT-LVL II: CPT | Mod: PBBFAC,,, | Performed by: SOCIAL WORKER

## 2020-11-30 PROCEDURE — 90834 PSYTX W PT 45 MINUTES: CPT | Mod: S$GLB,,, | Performed by: SOCIAL WORKER

## 2020-11-30 PROCEDURE — 99999 PR PBB SHADOW E&M-EST. PATIENT-LVL II: ICD-10-PCS | Mod: PBBFAC,,, | Performed by: SOCIAL WORKER

## 2020-11-30 PROCEDURE — 90834 PR PSYCHOTHERAPY W/PATIENT, 45 MIN: ICD-10-PCS | Mod: S$GLB,,, | Performed by: SOCIAL WORKER

## 2020-11-30 SDOH — SOCIAL DETERMINANTS OF HEALTH (SDOH): PROBLEM RELATED TO PRIMARY SUPPORT GROUP, UNSPECIFIED: Z63.9

## 2020-12-01 ENCOUNTER — TELEPHONE (OUTPATIENT)
Dept: PSYCHIATRY | Facility: CLINIC | Age: 65
End: 2020-12-01

## 2020-12-01 NOTE — TELEPHONE ENCOUNTER
Attempted to contact the patient in regards to scheduling and appointment. No answer message left on voice mail.

## 2020-12-23 DIAGNOSIS — G43.909 MIGRAINE WITHOUT STATUS MIGRAINOSUS, NOT INTRACTABLE, UNSPECIFIED MIGRAINE TYPE: ICD-10-CM

## 2020-12-23 RX ORDER — SUMATRIPTAN SUCCINATE 100 MG/1
TABLET ORAL
Qty: 9 TABLET | Refills: 1 | Status: SHIPPED | OUTPATIENT
Start: 2020-12-23

## 2021-02-19 ENCOUNTER — TELEPHONE (OUTPATIENT)
Dept: ADMINISTRATIVE | Facility: HOSPITAL | Age: 66
End: 2021-02-19

## 2021-03-08 ENCOUNTER — PATIENT MESSAGE (OUTPATIENT)
Dept: ADMINISTRATIVE | Facility: HOSPITAL | Age: 66
End: 2021-03-08

## 2021-05-13 ENCOUNTER — PATIENT OUTREACH (OUTPATIENT)
Dept: ADMINISTRATIVE | Facility: HOSPITAL | Age: 66
End: 2021-05-13

## 2021-12-15 ENCOUNTER — PATIENT OUTREACH (OUTPATIENT)
Dept: ADMINISTRATIVE | Facility: HOSPITAL | Age: 66
End: 2021-12-15
Payer: MEDICARE

## 2023-04-06 ENCOUNTER — PATIENT MESSAGE (OUTPATIENT)
Dept: RESEARCH | Facility: HOSPITAL | Age: 68
End: 2023-04-06
Payer: MEDICARE

## 2023-05-31 ENCOUNTER — PATIENT MESSAGE (OUTPATIENT)
Dept: RESEARCH | Facility: HOSPITAL | Age: 68
End: 2023-05-31
Payer: MEDICARE

## 2023-09-19 ENCOUNTER — TELEPHONE (OUTPATIENT)
Dept: NEUROLOGY | Facility: CLINIC | Age: 68
End: 2023-09-19
Payer: MEDICARE

## 2023-09-19 NOTE — TELEPHONE ENCOUNTER
Spoke with patient in regards to scheduling appt. Patient has been scheduled with  and added to the waiting list patient verbalized understanding.

## 2023-09-19 NOTE — TELEPHONE ENCOUNTER
----- Message from Marleen Echeverria sent at 9/19/2023 11:59 AM CDT -----  Contact: Ivy/Dr.Tran Chisholm  Pt is calling to get an appt. Please call back at [phone], if that one doesn't work call at [phone]      Or [phone] (arina)                  Thanks  SW